# Patient Record
Sex: MALE | Race: WHITE | NOT HISPANIC OR LATINO
[De-identification: names, ages, dates, MRNs, and addresses within clinical notes are randomized per-mention and may not be internally consistent; named-entity substitution may affect disease eponyms.]

---

## 2019-08-05 PROBLEM — Z00.00 ENCOUNTER FOR PREVENTIVE HEALTH EXAMINATION: Status: ACTIVE | Noted: 2019-08-05

## 2019-08-19 ENCOUNTER — APPOINTMENT (OUTPATIENT)
Dept: DERMATOLOGY | Facility: CLINIC | Age: 54
End: 2019-08-19
Payer: COMMERCIAL

## 2019-08-19 VITALS — BODY MASS INDEX: 30.8 KG/M2 | WEIGHT: 220 LBS | HEIGHT: 71 IN

## 2019-08-19 DIAGNOSIS — Z78.9 OTHER SPECIFIED HEALTH STATUS: ICD-10-CM

## 2019-08-19 DIAGNOSIS — Z84.0 FAMILY HISTORY OF DISEASES OF THE SKIN AND SUBCUTANEOUS TISSUE: ICD-10-CM

## 2019-08-19 DIAGNOSIS — Z86.79 PERSONAL HISTORY OF OTHER DISEASES OF THE CIRCULATORY SYSTEM: ICD-10-CM

## 2019-08-19 DIAGNOSIS — Z87.2 PERSONAL HISTORY OF DISEASES OF THE SKIN AND SUBCUTANEOUS TISSUE: ICD-10-CM

## 2019-08-19 DIAGNOSIS — L30.8 OTHER SPECIFIED DERMATITIS: ICD-10-CM

## 2019-08-19 DIAGNOSIS — D18.01 HEMANGIOMA OF SKIN AND SUBCUTANEOUS TISSUE: ICD-10-CM

## 2019-08-19 PROCEDURE — 99202 OFFICE O/P NEW SF 15 MIN: CPT

## 2019-08-19 PROCEDURE — D0125: CPT

## 2019-08-19 RX ORDER — AMLODIPINE AND OLMESARTAN MEDOXOMIL 5; 20 MG/1; MG/1
5-20 TABLET ORAL
Refills: 0 | Status: ACTIVE | COMMUNITY

## 2019-08-19 RX ORDER — METOPROLOL SUCCINATE 25 MG/1
25 TABLET, EXTENDED RELEASE ORAL
Refills: 0 | Status: ACTIVE | COMMUNITY

## 2019-08-19 RX ORDER — FLUOCINONIDE 0.5 MG/G
0.05 CREAM TOPICAL
Qty: 1 | Refills: 1 | Status: ACTIVE | COMMUNITY
Start: 2019-08-19 | End: 1900-01-01

## 2019-08-19 RX ORDER — KETOCONAZOLE 20 MG/G
2 CREAM TOPICAL
Refills: 0 | Status: ACTIVE | COMMUNITY

## 2019-08-19 NOTE — HISTORY OF PRESENT ILLNESS
[de-identified] : First visit for 53-year-old white male presenting with persistent peeling and cracking of the fingertips. Patient has had this problem in the past during the winter but this is the first summer where it has been persistent. No previous treatment.  Patient washes his hands frequently\par Patient also complains of growths on the back. [FreeTextEntry1] : Feeling of the fingertips

## 2019-08-19 NOTE — PHYSICAL EXAM
[Alert] : alert [Oriented x 3] : ~L oriented x 3 [Well Nourished] : well nourished [Face] : Face [Nose] : Nose [Eyelids] : Eyelids [Ears] : Ears [Lips] : Lips [FreeTextEntry3] : Focal hard brown dry scaling with rare seizures on the right one 2 and 3 fingers and left one 2 and 3 fingers\par Back: Mild to moderate 2-4 mm bright erythematous papules, some are protuberant

## 2023-03-05 ENCOUNTER — INPATIENT (INPATIENT)
Facility: HOSPITAL | Age: 58
LOS: 3 days | Discharge: HOME | DRG: 871 | End: 2023-03-09
Attending: INTERNAL MEDICINE | Admitting: STUDENT IN AN ORGANIZED HEALTH CARE EDUCATION/TRAINING PROGRAM
Payer: COMMERCIAL

## 2023-03-05 VITALS
HEIGHT: 71 IN | WEIGHT: 235.01 LBS | HEART RATE: 114 BPM | DIASTOLIC BLOOD PRESSURE: 80 MMHG | OXYGEN SATURATION: 94 % | RESPIRATION RATE: 20 BRPM | SYSTOLIC BLOOD PRESSURE: 136 MMHG | TEMPERATURE: 98 F

## 2023-03-05 DIAGNOSIS — J18.9 PNEUMONIA, UNSPECIFIED ORGANISM: ICD-10-CM

## 2023-03-05 LAB
ALBUMIN SERPL ELPH-MCNC: 3.4 G/DL — SIGNIFICANT CHANGE UP (ref 3.3–5.2)
ALP SERPL-CCNC: 104 U/L — SIGNIFICANT CHANGE UP (ref 40–120)
ALT FLD-CCNC: 191 U/L — HIGH
ANION GAP SERPL CALC-SCNC: 14 MMOL/L — SIGNIFICANT CHANGE UP (ref 5–17)
AST SERPL-CCNC: 188 U/L — HIGH
BASOPHILS # BLD AUTO: 0 K/UL — SIGNIFICANT CHANGE UP (ref 0–0.2)
BASOPHILS NFR BLD AUTO: 0 % — SIGNIFICANT CHANGE UP (ref 0–2)
BILIRUB SERPL-MCNC: 0.5 MG/DL — SIGNIFICANT CHANGE UP (ref 0.4–2)
BUN SERPL-MCNC: 17.8 MG/DL — SIGNIFICANT CHANGE UP (ref 8–20)
CALCIUM SERPL-MCNC: 8.8 MG/DL — SIGNIFICANT CHANGE UP (ref 8.4–10.5)
CHLORIDE SERPL-SCNC: 96 MMOL/L — SIGNIFICANT CHANGE UP (ref 96–108)
CO2 SERPL-SCNC: 25 MMOL/L — SIGNIFICANT CHANGE UP (ref 22–29)
CREAT SERPL-MCNC: 0.88 MG/DL — SIGNIFICANT CHANGE UP (ref 0.5–1.3)
EGFR: 100 ML/MIN/1.73M2 — SIGNIFICANT CHANGE UP
EOSINOPHIL # BLD AUTO: 0 K/UL — SIGNIFICANT CHANGE UP (ref 0–0.5)
EOSINOPHIL NFR BLD AUTO: 0 % — SIGNIFICANT CHANGE UP (ref 0–6)
FLUAV AG NPH QL: SIGNIFICANT CHANGE UP
FLUBV AG NPH QL: SIGNIFICANT CHANGE UP
GIANT PLATELETS BLD QL SMEAR: PRESENT — SIGNIFICANT CHANGE UP
GLUCOSE SERPL-MCNC: 120 MG/DL — HIGH (ref 70–99)
HCT VFR BLD CALC: 42.3 % — SIGNIFICANT CHANGE UP (ref 39–50)
HGB BLD-MCNC: 14.6 G/DL — SIGNIFICANT CHANGE UP (ref 13–17)
INR BLD: 1.17 RATIO — HIGH (ref 0.88–1.16)
LACTATE BLDV-MCNC: 2.1 MMOL/L — HIGH (ref 0.5–2)
LYMPHOCYTES # BLD AUTO: 0.28 K/UL — LOW (ref 1–3.3)
LYMPHOCYTES # BLD AUTO: 6.9 % — LOW (ref 13–44)
MANUAL SMEAR VERIFICATION: SIGNIFICANT CHANGE UP
MCHC RBC-ENTMCNC: 28.6 PG — SIGNIFICANT CHANGE UP (ref 27–34)
MCHC RBC-ENTMCNC: 34.5 GM/DL — SIGNIFICANT CHANGE UP (ref 32–36)
MCV RBC AUTO: 82.9 FL — SIGNIFICANT CHANGE UP (ref 80–100)
METAMYELOCYTES # FLD: 0.9 % — HIGH (ref 0–0)
MONOCYTES # BLD AUTO: 0.32 K/UL — SIGNIFICANT CHANGE UP (ref 0–0.9)
MONOCYTES NFR BLD AUTO: 7.8 % — SIGNIFICANT CHANGE UP (ref 2–14)
NEUTROPHILS # BLD AUTO: 3.42 K/UL — SIGNIFICANT CHANGE UP (ref 1.8–7.4)
NEUTROPHILS NFR BLD AUTO: 77.4 % — HIGH (ref 43–77)
NEUTS BAND # BLD: 6.1 % — SIGNIFICANT CHANGE UP (ref 0–8)
PLAT MORPH BLD: NORMAL — SIGNIFICANT CHANGE UP
PLATELET # BLD AUTO: 175 K/UL — SIGNIFICANT CHANGE UP (ref 150–400)
POTASSIUM SERPL-MCNC: 3.7 MMOL/L — SIGNIFICANT CHANGE UP (ref 3.5–5.3)
POTASSIUM SERPL-SCNC: 3.7 MMOL/L — SIGNIFICANT CHANGE UP (ref 3.5–5.3)
PROT SERPL-MCNC: 7.1 G/DL — SIGNIFICANT CHANGE UP (ref 6.6–8.7)
PROTHROM AB SERPL-ACNC: 13.6 SEC — HIGH (ref 10.5–13.4)
RBC # BLD: 5.1 M/UL — SIGNIFICANT CHANGE UP (ref 4.2–5.8)
RBC # FLD: 12.9 % — SIGNIFICANT CHANGE UP (ref 10.3–14.5)
RBC BLD AUTO: NORMAL — SIGNIFICANT CHANGE UP
RSV RNA NPH QL NAA+NON-PROBE: SIGNIFICANT CHANGE UP
SARS-COV-2 RNA SPEC QL NAA+PROBE: DETECTED
SMUDGE CELLS # BLD: PRESENT — SIGNIFICANT CHANGE UP
SODIUM SERPL-SCNC: 135 MMOL/L — SIGNIFICANT CHANGE UP (ref 135–145)
VARIANT LYMPHS # BLD: 0.9 % — SIGNIFICANT CHANGE UP (ref 0–6)
WBC # BLD: 4.1 K/UL — SIGNIFICANT CHANGE UP (ref 3.8–10.5)
WBC # FLD AUTO: 4.1 K/UL — SIGNIFICANT CHANGE UP (ref 3.8–10.5)

## 2023-03-05 PROCEDURE — 93010 ELECTROCARDIOGRAM REPORT: CPT

## 2023-03-05 PROCEDURE — 99285 EMERGENCY DEPT VISIT HI MDM: CPT

## 2023-03-05 PROCEDURE — 71250 CT THORAX DX C-: CPT | Mod: 26,MA

## 2023-03-05 PROCEDURE — 99223 1ST HOSP IP/OBS HIGH 75: CPT

## 2023-03-05 PROCEDURE — 71045 X-RAY EXAM CHEST 1 VIEW: CPT | Mod: 26

## 2023-03-05 RX ORDER — ACETAMINOPHEN 500 MG
1000 TABLET ORAL ONCE
Refills: 0 | Status: COMPLETED | OUTPATIENT
Start: 2023-03-05 | End: 2023-03-05

## 2023-03-05 RX ORDER — ENOXAPARIN SODIUM 100 MG/ML
40 INJECTION SUBCUTANEOUS EVERY 24 HOURS
Refills: 0 | Status: DISCONTINUED | OUTPATIENT
Start: 2023-03-05 | End: 2023-03-07

## 2023-03-05 RX ORDER — AMLODIPINE BESYLATE 2.5 MG/1
1 TABLET ORAL
Qty: 0 | Refills: 0 | DISCHARGE

## 2023-03-05 RX ORDER — BUDESONIDE AND FORMOTEROL FUMARATE DIHYDRATE 160; 4.5 UG/1; UG/1
2 AEROSOL RESPIRATORY (INHALATION)
Refills: 0 | Status: DISCONTINUED | OUTPATIENT
Start: 2023-03-05 | End: 2023-03-09

## 2023-03-05 RX ORDER — ALBUTEROL 90 UG/1
2 AEROSOL, METERED ORAL EVERY 4 HOURS
Refills: 0 | Status: DISCONTINUED | OUTPATIENT
Start: 2023-03-05 | End: 2023-03-09

## 2023-03-05 RX ORDER — LANOLIN ALCOHOL/MO/W.PET/CERES
3 CREAM (GRAM) TOPICAL AT BEDTIME
Refills: 0 | Status: DISCONTINUED | OUTPATIENT
Start: 2023-03-05 | End: 2023-03-09

## 2023-03-05 RX ORDER — CEFTRIAXONE 500 MG/1
1000 INJECTION, POWDER, FOR SOLUTION INTRAMUSCULAR; INTRAVENOUS ONCE
Refills: 0 | Status: DISCONTINUED | OUTPATIENT
Start: 2023-03-05 | End: 2023-03-05

## 2023-03-05 RX ORDER — AMLODIPINE BESYLATE 2.5 MG/1
5 TABLET ORAL DAILY
Refills: 0 | Status: DISCONTINUED | OUTPATIENT
Start: 2023-03-05 | End: 2023-03-09

## 2023-03-05 RX ORDER — PIPERACILLIN AND TAZOBACTAM 4; .5 G/20ML; G/20ML
3.38 INJECTION, POWDER, LYOPHILIZED, FOR SOLUTION INTRAVENOUS ONCE
Refills: 0 | Status: DISCONTINUED | OUTPATIENT
Start: 2023-03-05 | End: 2023-03-05

## 2023-03-05 RX ORDER — REMDESIVIR 5 MG/ML
INJECTION INTRAVENOUS
Refills: 0 | Status: DISCONTINUED | OUTPATIENT
Start: 2023-03-05 | End: 2023-03-09

## 2023-03-05 RX ORDER — REMDESIVIR 5 MG/ML
100 INJECTION INTRAVENOUS EVERY 24 HOURS
Refills: 0 | Status: DISCONTINUED | OUTPATIENT
Start: 2023-03-06 | End: 2023-03-09

## 2023-03-05 RX ORDER — AZITHROMYCIN 500 MG/1
500 TABLET, FILM COATED ORAL ONCE
Refills: 0 | Status: COMPLETED | OUTPATIENT
Start: 2023-03-05 | End: 2023-03-05

## 2023-03-05 RX ORDER — IPRATROPIUM/ALBUTEROL SULFATE 18-103MCG
3 AEROSOL WITH ADAPTER (GRAM) INHALATION EVERY 6 HOURS
Refills: 0 | Status: DISCONTINUED | OUTPATIENT
Start: 2023-03-05 | End: 2023-03-05

## 2023-03-05 RX ORDER — SODIUM CHLORIDE 9 MG/ML
1000 INJECTION, SOLUTION INTRAVENOUS
Refills: 0 | Status: COMPLETED | OUTPATIENT
Start: 2023-03-05 | End: 2023-03-05

## 2023-03-05 RX ORDER — METOPROLOL TARTRATE 50 MG
1 TABLET ORAL
Qty: 0 | Refills: 0 | DISCHARGE

## 2023-03-05 RX ORDER — REMDESIVIR 5 MG/ML
200 INJECTION INTRAVENOUS EVERY 24 HOURS
Refills: 0 | Status: COMPLETED | OUTPATIENT
Start: 2023-03-05 | End: 2023-03-05

## 2023-03-05 RX ORDER — DEXAMETHASONE 0.5 MG/5ML
6 ELIXIR ORAL DAILY
Refills: 0 | Status: DISCONTINUED | OUTPATIENT
Start: 2023-03-05 | End: 2023-03-09

## 2023-03-05 RX ORDER — ACETAMINOPHEN 500 MG
650 TABLET ORAL EVERY 6 HOURS
Refills: 0 | Status: DISCONTINUED | OUTPATIENT
Start: 2023-03-05 | End: 2023-03-09

## 2023-03-05 RX ORDER — CEFEPIME 1 G/1
1000 INJECTION, POWDER, FOR SOLUTION INTRAMUSCULAR; INTRAVENOUS EVERY 8 HOURS
Refills: 0 | Status: DISCONTINUED | OUTPATIENT
Start: 2023-03-05 | End: 2023-03-05

## 2023-03-05 RX ORDER — SODIUM CHLORIDE 9 MG/ML
2500 INJECTION INTRAMUSCULAR; INTRAVENOUS; SUBCUTANEOUS ONCE
Refills: 0 | Status: COMPLETED | OUTPATIENT
Start: 2023-03-05 | End: 2023-03-05

## 2023-03-05 RX ORDER — CEFTRIAXONE 500 MG/1
1000 INJECTION, POWDER, FOR SOLUTION INTRAMUSCULAR; INTRAVENOUS ONCE
Refills: 0 | Status: COMPLETED | OUTPATIENT
Start: 2023-03-05 | End: 2023-03-05

## 2023-03-05 RX ORDER — METOPROLOL TARTRATE 50 MG
50 TABLET ORAL DAILY
Refills: 0 | Status: DISCONTINUED | OUTPATIENT
Start: 2023-03-05 | End: 2023-03-09

## 2023-03-05 RX ADMIN — Medication 6 MILLIGRAM(S): at 18:42

## 2023-03-05 RX ADMIN — AZITHROMYCIN 500 MILLIGRAM(S): 500 TABLET, FILM COATED ORAL at 13:11

## 2023-03-05 RX ADMIN — SODIUM CHLORIDE 70 MILLILITER(S): 9 INJECTION, SOLUTION INTRAVENOUS at 18:44

## 2023-03-05 RX ADMIN — Medication 400 MILLIGRAM(S): at 13:08

## 2023-03-05 RX ADMIN — BUDESONIDE AND FORMOTEROL FUMARATE DIHYDRATE 2 PUFF(S): 160; 4.5 AEROSOL RESPIRATORY (INHALATION) at 20:44

## 2023-03-05 RX ADMIN — Medication 100 MILLIGRAM(S): at 18:42

## 2023-03-05 RX ADMIN — REMDESIVIR 200 MILLIGRAM(S): 5 INJECTION INTRAVENOUS at 18:39

## 2023-03-05 RX ADMIN — CEFTRIAXONE 1000 MILLIGRAM(S): 500 INJECTION, POWDER, FOR SOLUTION INTRAMUSCULAR; INTRAVENOUS at 12:03

## 2023-03-05 RX ADMIN — ALBUTEROL 2 PUFF(S): 90 AEROSOL, METERED ORAL at 20:44

## 2023-03-05 RX ADMIN — AZITHROMYCIN 255 MILLIGRAM(S): 500 TABLET, FILM COATED ORAL at 12:04

## 2023-03-05 RX ADMIN — SODIUM CHLORIDE 2500 MILLILITER(S): 9 INJECTION INTRAMUSCULAR; INTRAVENOUS; SUBCUTANEOUS at 12:04

## 2023-03-05 NOTE — ED ADULT NURSE NOTE - OBJECTIVE STATEMENT
Pt BIB EMS c/o SOB on and off 1 x wk hx w/ pna hx htn prostate ca  in no acute distress placed on cardiac monitor no respiratory distress noted pending md david rey rn

## 2023-03-05 NOTE — ED PROVIDER NOTE - PHYSICAL EXAMINATION
General: well appearing, NAD  Head: NC/AT  Cardiac: tachycardic, regular rhythm, no m/r/g  Respiratory: decreased breath sounds RLL, equal chest wall expansions, no conversational dyspnea  Abdomen: soft, ND, NT  Neuro: AAOx3, coordinated movement of all 4 extremities  Psych: calm, cooperative, normal affect  Skin: warm and dry

## 2023-03-05 NOTE — H&P ADULT - NSHPPHYSICALEXAM_GEN_ALL_CORE
GENERAL: pt examined bedside, laying comfortably in bed in NAD  HEENT: NC/AT, moist oral mucosa, clear conjunctiva, sclera nonicteric  RESPIRATORY: Normal respiratory effort; CTA b/l, no wheezing, rhonchi, rales  CARDIOVASCULAR: RRR, normal S1 and S2, no murmur/rub/gallop  ABDOMEN: soft, NT/ND, normoactive bowel sounds, no rebound/guarding  MSK: No joint deformities, edema, erythema  EXTREMITIES: No cynaosis, no clubbing, no lower extremity edema; Peripheral pulses are 2+ bilaterally  PSYCH: affect appropriate and cooperative  NEUROLOGY: A+O to person, place, and time, no focal neurologic deficits appreciated   SKIN: No rashes or no palpable lesions GENERAL: pt examined bedside, appears uncomfortable in mild distress, speaking only 3-4 word sentences   HEENT: NC/AT, dry oral mucosa, clear conjunctiva, sclera nonicteric  RESPIRATORY: poor inspiratory effort, scattered wheezing b/l and bibasilar rales   CARDIOVASCULAR: RRR, normal S1 and S2  ABDOMEN: soft, NT/ND, + bowel sounds, no rebound/guarding  MSK: No joint deformities, edema, erythema  EXTREMITIES: No cynaosis, no clubbing, no lower extremity edema, pulses are 2+ bilaterally  PSYCH: affect appropriate and cooperative  NEUROLOGY: A+O to person, place, and time, no focal neurologic deficits appreciated   SKIN: No rashes or no palpable lesions

## 2023-03-05 NOTE — ED PROVIDER NOTE - CLINICAL SUMMARY MEDICAL DECISION MAKING FREE TEXT BOX
56yo M w/pmh HTN, prostate ca presents for fever, cough, SOB x7d. Labs, CXR pending. Fluids, antibiotics given. 56yo M w/pmh HTN, prostate ca presents for fever, cough, SOB x7d. Fluids, antibiotics given. Labs show lactate 2.1, CXR and CT chest show multifocal PNA. Patient feels improved with medications but O2 persistently borderline at 92-93% on RA, HR 100s at rest. Plan for admission due to failure of outpatient antibiotics and persistently borderline SIRS vital signs.

## 2023-03-05 NOTE — H&P ADULT - ASSESSMENT
58 y/o M w/ PMH of prostate CA (not on tx), HTN, came in c/o sob w/ associated productive cough and subjective fevers x 1 week after returning from [place] via plane.  Pt had telehealth visit w/ PMD 5 days ago and was sent for an outpt CXR and as per pt was told he had a "double PNA" and was prescribed levaquin 750mg however symptoms persisted and continues to have fevers.  Septic in ED w/ tachycardia and tachypnea.  O2 sat ORA 92-94%.  Clinically toxic appearing and speaking 3-4 word sentences.  Poor inspiratory effort, +wheezing and rales on exam.  CT chest w/ b/l multifocal pna.  ED gave rocephin and azithro.   Found to be covid +.  Pt admitted for sepssi 2/2 covid PNA.       Sepsis poa 2/2 multifocal pna 2/2 covid pna   - Admit to medicine w/   - Tachy and tachypnic on admission  - Recieved 1x dose of rocephin and azithro in ED but given failed levaquin 750mg outpt however CT findings likely 2/2 covid PNA therefore will hold off on further abx for now   - Flu/RSV negative but COVID +; Placed on isolation precautions per protocol and started on remdesivir and decadron   - Monitor renal fxn and LFTs while on remdesivir  - Monitor inflamatory markers [id number]  - Mild LA of 2.1 from sepsis   - c/w gentle hydration overnight for insensible losses   - No leukocytosis but L-shift noted as well as bandemia of 6.1%   - Afebrile on arrival but reporting subjective fevers at home   - Transaminities likely 2/2 sepsis; monitor LFTs given starting on remdesivir   - Blood cxs ordered by ED; f/u results   - Will order sputum cultures  - Given CT chest findings full RVP, legionella/strep pna Uag and MRSA PCR  - Monitor pulse ox and place on supplemental O2 to maintain O2 sats >92%  - Bronchodiolators and mucolytics as needed   - Monitor CBC and temperature       Transaminitis   - Likely 2/2 sepsis   - ALP and tbili nL  - Asymptomatic and benign abd exam   - Montior LFTs closely given started on remdesivir   - Trend LFTs         VTE ppx: lovenox         58 y/o M w/ PMH of prostate CA (not on tx), HTN, came in c/o sob w/ associated productive cough and subjective fevers x 1 week after returning from [place] via plane.  Pt had telehealth visit w/ PMD 5 days ago and was sent for an outpt CXR and as per pt was told he had a "double PNA" and was prescribed levaquin 750mg however symptoms persisted and continues to have fevers.  Septic in ED w/ tachycardia and tachypnea.  O2 sat ORA 92-94%.  Clinically toxic appearing and speaking 3-4 word sentences.  Poor inspiratory effort, +wheezing and rales on exam.  CT chest w/ b/l multifocal pna.  ED gave rocephin and azithro.   Found to be covid +.  Pt admitted for sepsisi 2/2 covid PNA.       Sepsis poa 2/2 multifocal pna 2/2 covid pna   - Admit to medicine w/   - Tachy and tachypnic on admission  - Recieved 1x dose of rocephin and azithro in ED but given failed levaquin 750mg outpt however CT findings likely 2/2 covid PNA therefore will hold off on further abx for now   - Flu/RSV negative but COVID +; Placed on isolation precautions per protocol and started on remdesivir and decadron   - Monitor renal fxn and LFTs while on remdesivir  - Monitor inflamatory markers [id number]  - Mild LA of 2.1 from sepsis   - c/w gentle hydration overnight for insensible losses   - No leukocytosis but L-shift noted as well as bandemia of 6.1%   - Afebrile on arrival but reporting subjective fevers at home   - Transaminities likely 2/2 sepsis; monitor LFTs given starting on remdesivir   - Blood cxs ordered by ED; f/u results   - Will order sputum cultures  - Given CT chest findings full RVP, legionella/strep pna Uag and MRSA PCR  - Monitor pulse ox and place on supplemental O2 to maintain O2 sats >92%  - Bronchodiolators and mucolytics as needed   - Monitor CBC and temperature   - Incentive spirometer ordered       Transaminitis   - Likely 2/2 sepsis   - ALP and tbili nL  - Asymptomatic and benign abd exam   - Montior LFTs closely given started on remdesivir   - Trend LFTs       HTN   - Resume home meds         VTE ppx: lovenox

## 2023-03-05 NOTE — ED ADULT TRIAGE NOTE - CHIEF COMPLAINT QUOTE
Pt BIBA for worsening sob, fever and body aches.  Pt being treated for double pna on Levaquin since Tuesday.  Pt last took motrin/tylenol at 8am for fever.  Pt denies chest pain

## 2023-03-05 NOTE — H&P ADULT - NSHPREVIEWOFSYSTEMS_GEN_ALL_CORE
CONSTITUTIONAL: no fevers, chills, night sweats, weight changes  HEENT: no vision changes or diplopia, no tinnitus, no sore throat  RESPIRATORY: denies dyspnea, sob, nocturnal cough, sputum or hemoptysis  CARDIOVASCULAR: no CP, palpitations or lower extremity edema  GI: no dysphagia, nausea, abd pain, constipation, diarrhea, stool change or blood in stool  : no dysuria or hematuria, no flank pain, no incontinence or urinary retention  MSK: no joint pain or swelling  INTEGUMENTARY: no rashes or lesions  NEUROLOGICAL: no HA, confusion, syncope, numbness, weakness, tremors or ataxia  PSYCH: denies depression  ENDOCRINE: no polyuria, polydipsia, no temp intolerance, no tremors, no changes in skin, hair or nails  HEMATOLOGIC/LYMPHATIC: no lymph node enlargement, abnormal bruising or bleeding CONSTITUTIONAL: +fevers, +chills, no night sweats, weight changes  HEENT: no vision changes or diplopia, no tinnitus, no sore throat  RESPIRATORY: +dyspnea,+ sob, nocturnal cough, + sputum or no hemoptysis  CARDIOVASCULAR: no CP, palpitations or lower extremity edema  GI: no dysphagia, nausea, abd pain, constipation, diarrhea, stool change or blood in stool  : no dysuria or hematuria, no flank pain, no incontinence or urinary retention  MSK: no joint pain or swelling  INTEGUMENTARY: no rashes or lesions  NEUROLOGICAL: no HA, confusion, syncope, numbness, weakness, tremors or ataxia  PSYCH: denies depression  ENDOCRINE: no polyuria, polydipsia, no temp intolerance, no tremors, no changes in skin, hair or nails  HEMATOLOGIC/LYMPHATIC: no lymph node enlargement, abnormal bruising or bleeding

## 2023-03-05 NOTE — H&P ADULT - HISTORY OF PRESENT ILLNESS
CONSTITUTIONAL: no fevers, chills, night sweats, weight changes  HEENT: no vision changes or diplopia, no tinnitus, no sore throat  RESPIRATORY: denies dyspnea, sob, nocturnal cough, sputum or hemoptysis  CARDIOVASCULAR: no CP, palpitations or lower extremity edema  GI: no dysphagia, nausea, abd pain, constipation, diarrhea, stool change or blood in stool  : no dysuria or hematuria, no flank pain, no incontinence or urinary retention  MSK: no joint pain or swelling  INTEGUMENTARY: no rashes or lesions  NEUROLOGICAL: no HA, confusion, syncope, numbness, weakness, tremors or ataxia  PSYCH: denies depression  ENDOCRINE: no polyuria, polydipsia, no temp intolerance, no tremors, no changes in skin, hair or nails  HEMATOLOGIC/LYMPHATIC: no lymph node enlargement, abnormal bruising or bleeding   56 y/o M w/ PMH of prostate CA (not on tx), HTN, came in c/o sob w/ associated productive cough and subjective fevers x 1 week after returning from [place] via plane.  Pt had telehealth visit w/ PMD 5 days ago and was sent for an outpt CXR and as per pt was told he had a "double PNA" and was prescribed levaquin 750mg however symptoms persisted and continues to have fevers.  Pt reports his fever yesterday was as high as 103.6.  He has been taking NSAIDs and tylenol for fevers and has been compliant w/ abxs but symptoms have worsened.  Pt denies sick contacts however recently traveled from [place].  Pt also denies cp, palpitations, LE swelling/pain, orthopnea, hemoptysis.

## 2023-03-05 NOTE — ED PROVIDER NOTE - ATTENDING CONTRIBUTION TO CARE
Jorje: I performed a face to face evaluation of this patient and performed a full history and physical examination on the patient.  I agree with the resident's history, physical examination, and plan of the patient unless otherwise noted. My brief assessment is as follows: pmh as documented c/o 6 days of fever to 103-104, had xray and was told had "double pna" started on levaquin. continued fever today with cough and sob. took 200mg iburpfeon at 7am and no fever since per pt. no abd pain, no urinary symptoms,n o chest pain, no uri symptoms. non toxic, well appearing, satting 95% on room air, clear lungs, speaking full sentences. pulse ~100-110 abd benign. no swelling b/l LE. neuro intact. sepsis labs, fluids, abx, xr/chest ct. reassess

## 2023-03-05 NOTE — ED ADULT NURSE REASSESSMENT NOTE - NS ED NURSE REASSESS COMMENT FT1
Pt report given to CDU RN , pt in no acute distress denies any discomfort at this time - pending transportation to unit candido daly

## 2023-03-05 NOTE — ED PROVIDER NOTE - OBJECTIVE STATEMENT
56yo M w/pmh HTN, prostate ca presents for fever, cough, SOB x7d. Reports onset last Sunday, telehealth meeting with his PCP Tuesday (5d ago), reports outpatient CXR showed "double PNA", started on levoquin. Reports symptoms unchanged since then despite medication compliance, Tmax 103.6, taking tylenol and ibuprofen around the clock. Denies smoking, asthma, other lung disease. Reports he occasionally gets a medication for the prostate ca when his PSH gets too high, most recently 6 months ago. No recent antibiotics prior to the levoquin, or steroids. Reports childhood penicillin allergy.

## 2023-03-05 NOTE — H&P ADULT - NSHPLABSRESULTS_GEN_ALL_CORE
14.6   4.10  )-----------( 175      ( 05 Mar 2023 12:00 )             42.3         03-05    135  |  96  |  17.8  ----------------------------<  120<H>  3.7   |  25.0  |  0.88    Ca    8.8      05 Mar 2023 12:00    TPro  7.1  /  Alb  3.4  /  TBili  0.5  /  DBili  x   /  AST  188<H>  /  ALT  191<H>  /  AlkPhos  104  03-05      < from: Xray Chest 1 View- PORTABLE-Urgent (Xray Chest 1 View- PORTABLE-Urgent .) (03.05.23 @ 13:10) >      IMPRESSION: There is bibasilar patchy opacification that is more   prominent on the right and may be compatible with pneumonia. The heart is   normal in size. The bones are intact.    < end of copied text >        < from: CT Chest No Cont (03.05.23 @ 13:51) >    IMPRESSION: Bilateral lower lung opacities, most confluent within the   right lower lobe as described above suggestive of pneumonia with   endobronchial spread.    Associated mediastinal and right hilar lymph nodes as above are likely   reactive.    Comparison with prior studies are recommended and if images are made   available, an addendum can be issued. Alternatively a 1-3 month follow-up   noncontrast chest CT is recommended to ensure resolution.    < end of copied text >

## 2023-03-05 NOTE — ED PROVIDER NOTE - NS ED ROS FT
Constitutional: +fever, no sweats, and no chills.  CV: no chest pain, no edema.  Resp: +cough, +dyspnea  GI: no abdominal pain, no nausea and no vomiting.  MSK: no msk pain, no weakness  Skin: no lesions, and no rashes.  Neuro: no LOC, no headache, no dizziness  ROS otherwise negative except as noted in HPI.

## 2023-03-06 LAB
ALBUMIN SERPL ELPH-MCNC: 3.3 G/DL — SIGNIFICANT CHANGE UP (ref 3.3–5.2)
ALP SERPL-CCNC: 97 U/L — SIGNIFICANT CHANGE UP (ref 40–120)
ALT FLD-CCNC: 189 U/L — HIGH
ANION GAP SERPL CALC-SCNC: 11 MMOL/L — SIGNIFICANT CHANGE UP (ref 5–17)
ANION GAP SERPL CALC-SCNC: 16 MMOL/L — SIGNIFICANT CHANGE UP (ref 5–17)
AST SERPL-CCNC: 156 U/L — HIGH
BASOPHILS # BLD AUTO: 0 K/UL — SIGNIFICANT CHANGE UP (ref 0–0.2)
BASOPHILS NFR BLD AUTO: 0 % — SIGNIFICANT CHANGE UP (ref 0–2)
BILIRUB SERPL-MCNC: 0.4 MG/DL — SIGNIFICANT CHANGE UP (ref 0.4–2)
BUN SERPL-MCNC: 12.1 MG/DL — SIGNIFICANT CHANGE UP (ref 8–20)
BUN SERPL-MCNC: 15.4 MG/DL — SIGNIFICANT CHANGE UP (ref 8–20)
CALCIUM SERPL-MCNC: 8.6 MG/DL — SIGNIFICANT CHANGE UP (ref 8.4–10.5)
CALCIUM SERPL-MCNC: 8.7 MG/DL — SIGNIFICANT CHANGE UP (ref 8.4–10.5)
CHLORIDE SERPL-SCNC: 96 MMOL/L — SIGNIFICANT CHANGE UP (ref 96–108)
CHLORIDE SERPL-SCNC: 99 MMOL/L — SIGNIFICANT CHANGE UP (ref 96–108)
CO2 SERPL-SCNC: 23 MMOL/L — SIGNIFICANT CHANGE UP (ref 22–29)
CO2 SERPL-SCNC: 27 MMOL/L — SIGNIFICANT CHANGE UP (ref 22–29)
CREAT SERPL-MCNC: 0.75 MG/DL — SIGNIFICANT CHANGE UP (ref 0.5–1.3)
CREAT SERPL-MCNC: 0.94 MG/DL — SIGNIFICANT CHANGE UP (ref 0.5–1.3)
CRP SERPL-MCNC: 166 MG/L — HIGH
D DIMER BLD IA.RAPID-MCNC: 2146 NG/ML DDU — HIGH
EGFR: 105 ML/MIN/1.73M2 — SIGNIFICANT CHANGE UP
EGFR: 94 ML/MIN/1.73M2 — SIGNIFICANT CHANGE UP
EOSINOPHIL # BLD AUTO: 0 K/UL — SIGNIFICANT CHANGE UP (ref 0–0.5)
EOSINOPHIL NFR BLD AUTO: 0 % — SIGNIFICANT CHANGE UP (ref 0–6)
FERRITIN SERPL-MCNC: 5308 NG/ML — HIGH (ref 30–400)
GAS PNL BLDA: SIGNIFICANT CHANGE UP
GAS PNL BLDA: SIGNIFICANT CHANGE UP
GIANT PLATELETS BLD QL SMEAR: PRESENT — SIGNIFICANT CHANGE UP
GLUCOSE SERPL-MCNC: 171 MG/DL — HIGH (ref 70–99)
GLUCOSE SERPL-MCNC: 203 MG/DL — HIGH (ref 70–99)
HCT VFR BLD CALC: 38.4 % — LOW (ref 39–50)
HGB BLD-MCNC: 13.6 G/DL — SIGNIFICANT CHANGE UP (ref 13–17)
INR BLD: 1.19 RATIO — HIGH (ref 0.88–1.16)
LDH SERPL L TO P-CCNC: 504 U/L — HIGH (ref 98–192)
LYMPHOCYTES # BLD AUTO: 0.17 K/UL — LOW (ref 1–3.3)
LYMPHOCYTES # BLD AUTO: 7 % — LOW (ref 13–44)
MAGNESIUM SERPL-MCNC: 2.2 MG/DL — SIGNIFICANT CHANGE UP (ref 1.8–2.6)
MANUAL SMEAR VERIFICATION: SIGNIFICANT CHANGE UP
MCHC RBC-ENTMCNC: 29.1 PG — SIGNIFICANT CHANGE UP (ref 27–34)
MCHC RBC-ENTMCNC: 35.4 GM/DL — SIGNIFICANT CHANGE UP (ref 32–36)
MCV RBC AUTO: 82.1 FL — SIGNIFICANT CHANGE UP (ref 80–100)
MONOCYTES # BLD AUTO: 0.11 K/UL — SIGNIFICANT CHANGE UP (ref 0–0.9)
MONOCYTES NFR BLD AUTO: 4.4 % — SIGNIFICANT CHANGE UP (ref 2–14)
NEUTROPHILS # BLD AUTO: 2.06 K/UL — SIGNIFICANT CHANGE UP (ref 1.8–7.4)
NEUTROPHILS NFR BLD AUTO: 82.5 % — HIGH (ref 43–77)
NEUTS BAND # BLD: 3.5 % — SIGNIFICANT CHANGE UP (ref 0–8)
PLAT MORPH BLD: NORMAL — SIGNIFICANT CHANGE UP
PLATELET # BLD AUTO: 165 K/UL — SIGNIFICANT CHANGE UP (ref 150–400)
POTASSIUM SERPL-MCNC: 3.3 MMOL/L — LOW (ref 3.5–5.3)
POTASSIUM SERPL-MCNC: 3.4 MMOL/L — LOW (ref 3.5–5.3)
POTASSIUM SERPL-SCNC: 3.3 MMOL/L — LOW (ref 3.5–5.3)
POTASSIUM SERPL-SCNC: 3.4 MMOL/L — LOW (ref 3.5–5.3)
PROCALCITONIN SERPL-MCNC: 1.21 NG/ML — HIGH (ref 0.02–0.1)
PROT SERPL-MCNC: 6.7 G/DL — SIGNIFICANT CHANGE UP (ref 6.6–8.7)
PROTHROM AB SERPL-ACNC: 13.8 SEC — HIGH (ref 10.5–13.4)
RBC # BLD: 4.68 M/UL — SIGNIFICANT CHANGE UP (ref 4.2–5.8)
RBC # FLD: 12.9 % — SIGNIFICANT CHANGE UP (ref 10.3–14.5)
RBC BLD AUTO: NORMAL — SIGNIFICANT CHANGE UP
SODIUM SERPL-SCNC: 134 MMOL/L — LOW (ref 135–145)
SODIUM SERPL-SCNC: 138 MMOL/L — SIGNIFICANT CHANGE UP (ref 135–145)
VARIANT LYMPHS # BLD: 2.6 % — SIGNIFICANT CHANGE UP (ref 0–6)
WBC # BLD: 2.39 K/UL — LOW (ref 3.8–10.5)
WBC # FLD AUTO: 2.39 K/UL — LOW (ref 3.8–10.5)

## 2023-03-06 PROCEDURE — 71275 CT ANGIOGRAPHY CHEST: CPT | Mod: 26

## 2023-03-06 PROCEDURE — 99233 SBSQ HOSP IP/OBS HIGH 50: CPT

## 2023-03-06 RX ORDER — POTASSIUM CHLORIDE 20 MEQ
40 PACKET (EA) ORAL ONCE
Refills: 0 | Status: DISCONTINUED | OUTPATIENT
Start: 2023-03-06 | End: 2023-03-06

## 2023-03-06 RX ADMIN — ALBUTEROL 2 PUFF(S): 90 AEROSOL, METERED ORAL at 20:42

## 2023-03-06 RX ADMIN — BUDESONIDE AND FORMOTEROL FUMARATE DIHYDRATE 2 PUFF(S): 160; 4.5 AEROSOL RESPIRATORY (INHALATION) at 20:43

## 2023-03-06 RX ADMIN — Medication 6 MILLIGRAM(S): at 05:35

## 2023-03-06 RX ADMIN — AMLODIPINE BESYLATE 5 MILLIGRAM(S): 2.5 TABLET ORAL at 05:34

## 2023-03-06 RX ADMIN — Medication 650 MILLIGRAM(S): at 00:14

## 2023-03-06 RX ADMIN — Medication 650 MILLIGRAM(S): at 01:31

## 2023-03-06 RX ADMIN — ENOXAPARIN SODIUM 40 MILLIGRAM(S): 100 INJECTION SUBCUTANEOUS at 05:35

## 2023-03-06 RX ADMIN — BUDESONIDE AND FORMOTEROL FUMARATE DIHYDRATE 2 PUFF(S): 160; 4.5 AEROSOL RESPIRATORY (INHALATION) at 09:20

## 2023-03-06 RX ADMIN — ALBUTEROL 2 PUFF(S): 90 AEROSOL, METERED ORAL at 09:20

## 2023-03-06 RX ADMIN — ALBUTEROL 2 PUFF(S): 90 AEROSOL, METERED ORAL at 17:55

## 2023-03-06 RX ADMIN — REMDESIVIR 200 MILLIGRAM(S): 5 INJECTION INTRAVENOUS at 17:55

## 2023-03-06 RX ADMIN — Medication 50 MILLIGRAM(S): at 05:35

## 2023-03-06 RX ADMIN — ALBUTEROL 2 PUFF(S): 90 AEROSOL, METERED ORAL at 05:21

## 2023-03-06 RX ADMIN — ALBUTEROL 2 PUFF(S): 90 AEROSOL, METERED ORAL at 00:11

## 2023-03-06 NOTE — PROVIDER CONTACT NOTE (CRITICAL VALUE NOTIFICATION) - RECOMMENDATIONS
I reviewed the H&P, I examined the patient, and there are no changes in the patient's condition.    
awaiting stat blood work to result.

## 2023-03-06 NOTE — CHART NOTE - NSCHARTNOTEFT_GEN_A_CORE
RN  called @ 1:13 3/6/2023, pt had oral temp of 103 @23:36 on 3/5/2023, improved w/ tylenol. Pt admitted for sepsis w/ B/L PNA most likely from COVID. Pt satting in high 80's, requiring 2 L NC. CT of the chest reviewed, RVP + COVID, BC x2 pending. Labs ordered. Will continue to monitor labs, VS and sx. RN  called @ 1:13 3/6/2023, pt had oral temp of 103 @23:36 on 3/5/2023, improved w/ tylenol. Pt admitted for sepsis w/ B/L PNA most likely from COVID. Pt satting in high 80's, requiring 2 L NC. CT of the chest reviewed, RVP + COVID, BC x2 pending. Labs ordered. Will continue to monitor labs, VS and sx.    Addendum: 58 y/o M w/ PMH of prostate CA (not on tx), HTN, came in c/o sob w/ associated productive cough and subjective fevers x 1 week after returning from [place] via plane.  Pt found to have COVID and b/l multifocal PNA. RN called pt was satting in high 80's when sleeping, 2 L NC placed. Evaluated patient at bedside and  he states he is doing well, denies SOB s/p NC, dyspnea, palpitations, CP, dizziness and headache.    T(C): 36.6   HR: 94   BP: 139/75   RR: 20   SpO2: 95%    CONSTITUTIONAL: Well groomed, no apparent distress, w/ 2 L NC   EYES: PERRLA and symmetric, EOMI  ENMT: dry mucosal membranes  RESP: No respiratory distress, no use of accessory muscles bibasal rales, wheezing improved   CV: tachycardic +S1S2, no MRG; no JVD; no peripheral edema  SKIN: No rashes or ulcers noted  PSYCH: Appropriate insight/judgment; A+O x 3    Assessment/Plan:   -In setting of prostate CA, COVID w/ tachycardia and tachypnea, labs ordered D Dimer >2000   -CTA of the chest ordered r/o PE   -ABG slightly lower O2 w/ 2 L NC from original ABG   -will continue to monitor VS, labs and sx  -sign out to daytime PA

## 2023-03-06 NOTE — PATIENT PROFILE ADULT - LIVING ENVIRONMENT
Cardiology Consult           Date of Admission:  2/28/2023  Date of Consultation:  3/1/2023      PCP:  Nahun Patterson MD      Chief Complaint: Hypertension    History of Present Illness:  Arlene Gonzalez is a 60 y.o. female who presents with history significant for multivessel CAD status post CABG, essential hypertension, mixed hyperlipidemia, CKD, preserved EF 60%. Communication somewhat challenging due to language barrier. Patient originally presents to the ER due to ongoing nausea and what appears to be some flank pain. Patient is followed closely in the outpatient setting due to stage IV CKD. On presentation, creatinine shown to be 3.5. Sodium of 123. Potassium 2.8. Magnesium 1.5. Electrolytes were repleted. Patient was also noted to be significantly hypertensive with systolic greater than 613X. She was started on nifedipine drip with gradual improvement of blood pressures. She was transitioned over to p.o. nifedipine along with the addition of her home ramipril. Troponins were noted to be slightly elevated at 41 and 46. This likely secondary to hypertensive urgency as well as acute on chronic kidney disease. EKG was performed showing sinus rhythm with known right bundle branch block. No evidence of any acute ischemic changes noted. Currently patient states doing better. Completely denies any chest pain or significant shortness of breath. Nausea has begun to resolve. Patient currently off nifedipine drip. Blood pressures much more stable with most recent systolic blood pressure 523. Upon review telemetry patient noted to be in sinus rhythm. No significant arrhythmias noted overnight.       PMH:   has a past medical history of CAD (coronary artery disease), CHF (congestive heart failure) ([address] CKD (chronic kidney disease) stage 3, GFR 30-59 ml/min ([address] Diabetes mellitus ([facility] [address] Hyperkalemia, Hypertension, Microscopic hematuria, Proteinuria, Renal failure, Status post coronary artery bypass grafting, Type II or unspecified type diabetes mellitus without mention of complication, not stated as uncontrolled, and UTI (urinary tract infection). PSH:   has a past surgical history that includes Cholecystectomy; Cardiac surgery; Coronary artery bypass graft; Cataract extraction, extracapsular w/ intraocular lens implant (Right, 09/01/2022); and Cataract removal (Right, 9/1/2022). Allergies: Allergies   Allergen Reactions    Ace Inhibitors      DECREAESES KIDNEY FUNCTION- ACE AND ARBS        Home Meds:    Prior to Admission medications    Medication Sig Start Date End Date Taking?  Authorizing Provider   omeprazole (PRILOSEC) 40 MG delayed release capsule TAKE 1 CAPSULE BY MOUTH ONCE DAILY FOR 90 DAYS 11/27/22  Yes Historical Provider, MD   BISACODYL 5 MG EC tablet USE AS DIRECTED 12/10/21  Yes Historical Provider, MD   magnesium oxide (MAG-OX) 400 (241.3 Mg) MG TABS tablet TAKE 1 TABLET BY MOUTH ONCE DAILY WITH FOOD FOR 90 DAYS 10/26/20  Yes Historical Provider, MD   Sennosides-Docusate Sodium 8.6-50 MG CAPS Take 2 tablets by mouth daily   Yes Historical Provider, MD [facility] KW[facility]EN [facility] UNIT/ML injection pen Inject 42 Units into the skin daily 5/16/19  Yes Historical Provider, MD   sodium bicarbonate 650 MG tablet Take 650 mg by mouth daily 5/16/19  Yes Historical Provider, MD   ramipril (ALTACE) 2.5 MG capsule Take 1 capsule by mouth daily  Patient taking differently: Take 2.5 mg by mouth daily Hold 3/28/19  Yes Kellie Smith MD   carvedilol (COREG) 6.25 MG tablet Take 1 tablet by mouth 2 times daily  Patient taking differently: Take 3.125 mg by mouth 2 times daily 3/27/19  Yes Kellie Smith MD   pravastatin (PRAVACHOL) 20 MG tablet Take 20 mg by mouth nightly   Yes Historical Provider, MD   aspirin 81 MG EC tablet Take 162 mg by mouth daily    Yes Historical Provider, MD   potassium chloride (KLOR-CON M) 20 MEQ extended release tablet Take 2 tablets by mouth daily for 4 doses Stop KAYLEE PEDROMO [facility] - take potassium Chloride 40mEq today and 40mEq tomorrow recheck potassium on Monday morning 2/24/23 2/28/23  Marivel Bethea MD   ferrous sulfate (IRON 325) 325 (65 Fe) MG tablet Take by mouth daily    Historical Provider, MD   vitamin D3 (CHOLECALCIFEROL) 125 MCG (5000 UT) TABS tablet 1,000 Units daily    Historical Provider, MD   TRUE METRIX BLOOD GLUCOSE TEST strip USE AS DIRECTED ONCE DAILY 10/26/20   Historical Provider, MD   [address] USE TWICE DAILY 10/26/20   Historical Provider, MD   Blood Pressure Monitor KIT Take Bp daily 11/23/20   Marivel Bethea MD        Hospital Meds:    Current Facility-Administered Medications   Medication Dose Route Frequency Provider Last Rate Last Admin    0.9 % sodium chloride infusion   IntraVENous Continuous Elfego Peng MD 75 mL/hr at 03/01/23 0119 New Bag at 03/01/23 0119    sodium chloride flush 0.9 % injection 5-40 mL  5-40 mL IntraVENous 2 times per day BRADY Franklin NP        sodium chloride flush 0.9 % injection 5-40 mL  5-40 mL IntraVENous PRN BRADY Hinton - NP        0.9 % sodium chloride infusion   IntraVENous PRN BRADY Franklin NP        heparin (porcine) injection 5,000 Units  5,000 Units SubCUTAneous 3 times per day BRADY Franklin NP   5,000 Units at 03/01/23 9776    acetaminophen (TYLENOL) tablet 650 mg  650 mg Oral Q6H PRN BRADY Franklin NP        Or    acetaminophen (TYLENOL) suppository 650 mg  650 mg Rectal Q6H PRN BRADY Franklin - NP        aspirin EC tablet 162 mg  162 mg Oral Daily BRADY Hinton NP        insulin glargine (LANTUS) injection vial 42 Units  42 Units SubCUTAneous Daily BRADY Hinton NP        glucose chewable tablet 16 g  4 tablet Oral PRN BRADY Hinton - NP        dextrose bolus 10% 125 mL  125 mL IntraVENous PRN BRADY Hinton NP        Or    dextrose bolus 10% 250 mL  250 mL IntraVENous PRN Parish Berry, APRN - NP        glucagon (rDNA) injection 1 mg  1 mg IntraMUSCular PRN Yissel Bentley, APRN - NP        dextrose 10 % infusion   IntraVENous Continuous PRN Yissel Bentley, APRN - NP        insulin lispro (HUMALOG) injection vial 0-8 Units  0-8 Units SubCUTAneous TID  Yissel Bentley, APRN - NP        insulin lispro (HUMALOG) injection vial 0-4 Units  0-4 Units SubCUTAneous Nightly Yissel Bentley, APRN - NP        bisacodyl (DULCOLAX) EC tablet 5 mg  5 mg Oral Daily PRN Parish Berry, APRN - NP        ferrous sulfate (IRON 325) tablet 325 mg  325 mg Oral Daily with breakfast Parish Berry, APRN - NP        magnesium oxide (MAG-OX) tablet 400 mg  400 mg Oral Daily Yissel Bentley, APRN - NP        pantoprazole (PROTONIX) tablet 40 mg  40 mg Oral QAM  Yissel Bentley, APRN - NP        [START ON 3/2/2023] pravastatin (PRAVACHOL) tablet 20 mg  20 mg Oral Nightly Yissel Bentley, APRN - NP        ramipril (ALTACE) capsule 2.5 mg  2.5 mg Oral Daily Yissel Bentley, APRN - NP        sennosides-docusate sodium (SENOKOT-S) 8.6-50 MG tablet 2 tablet  2 tablet Oral Daily PRN Parish Berry, APRN - NP        sodium bicarbonate tablet 650 mg  650 mg Oral Daily Yissel Bentley, APRN - NP        prochlorperazine (COMPAZINE) injection 10 mg  10 mg IntraVENous Q6H PRN Parish Berry, APRN - NP   10 mg at 03/01/23 0413    hydrALAZINE (APRESOLINE) injection 10 mg  10 mg IntraVENous Q6H PRN Yissel Bentley, APRN - NP        potassium chloride 10 mEq/100 mL IVPB (Peripheral Line)  10 mEq IntraVENous Q1H Mary De Guzman MD        carvedilol (COREG) tablet 6.25 mg  6.25 mg Oral BID  Richard R Schoenrock, APRN - CNP           Social History:       TOBACCO:   reports that she has never smoked. She has never used smokeless tobacco.  ETOH:   reports no history of alcohol use. DRUGS:  reports no history of drug use.   OCCUPATION:          Family Histroy:         Problem Relation Age of Onset    Heart Attack Mother Diabetes Mother     Diabetes Father            Review of Systems:   Constitutional: there has been no unanticipated weight loss. There's been no change in energy level, sleep pattern, or activity level.     Eyes: No visual changes or diplopia. No scleral icterus.  ENT: No Headaches, hearing loss or vertigo. No mouth sores or sore throat.  Cardiovascular: No chest pain, positive dyspnea on exertion, palpitations or loss of consciousness. No cough, hemoptysis, pleuritic pain, or phlebitis.  Respiratory: No cough or wheezing, no sputum production. No hematemesis.    Gastrointestinal: No abdominal pain, appetite loss, blood in stools. No change in bowel or bladder habits.  Genitourinary: No dysuria, trouble voiding, or hematuria.  Musculoskeletal:  No gait disturbance, weakness or joint complaints.  Integumentary: No rash or pruritis.  Neurological: No headache, diplopia, change in muscle strength, numbness or tingling. No change in gait, balance, coordination, mood, affect, memory, mentation, behavior.  Psychiatric: No anxiety, or depression.  Endocrine: No temperature intolerance. No excessive thirst, fluid intake, or urination. No tremor.  Hematologic/Lymphatic: No abnormal bruising or bleeding, blood clots or swollen lymph nodes.  Allergic/Immunologic: No nasal congestion or hives.       Physical Exam    Vital Signs: BP (!) 116/54   Pulse 91   Temp 98.1 °F (36.7 °C) (Oral)   Resp 16   Ht 5' (1.524 m)   Wt 126 lb (57.2 kg)   SpO2 95%   BMI 24.61 kg/m²        Admission Weight: 126 lb (57.2 kg)     General appearance: Awake, Alert Cooperative    Head: Normocephalic, without obvious abnormality, atraumatic    Eyes: Conjunctivae/corneas clear. PERRL, EOM's intact. Fundi benign    Neck: no adenopathy, no carotid bruit, no JVD, supple, symmetrical, trachea midline and thyroid: not enlarged, symmetric, no tenderness/mass/nodules    Lungs: clear diminished to auscultation bilaterally    Heart: regular rate and rhythm,  S1, S2 normal, no murmur, click, rub or gallop    Abdomen: Soft, non-tender. Bowel sounds normal. No masses,  no organomegaly    Extremities: extremities normal, atraumatic, no cyanosis or edema    Skin: Skin color, texture, turgor normal. No rashes or lesions    Neurologic: Grossly normal        MEDICAL DECISION MAKING/TESTING    Cardiac Cath:      Echo/Stress:    Narrative    · Low normal to mildly reduced left ventricular ejection fraction   · The apex is hypokinetic     Left Ventricle   Normal left ventricular end-diastolic dimension. Normal left ventricular wall thickness. The estimated left ventricular ejection fraction is 50-55%. Low normal to mildly reduced left ventricular ejection fraction. The apex is hypokinetic. Grade I (impaired relaxation) left ventricular diastolic dysfunction. Right Ventricle   Normal size right ventricle. Normal right ventricular systolic function. Mitral Valve   The mitral valve structure appears normal. No mitral valve stenosis. Mild mitral regurgitation. Tricuspid Valve   The tricuspid valve structure is normal. No tricuspid valve stenosis. Mild tricuspid valve regurgitation. Aortic Valve   The aortic valve structure is normal. No aortic stenosis. Trace aortic valve regurgitation. Pulmonic Valve   The pulmonic valve is not well visualized. No pulmonic stenosis. No pulmonic regurgitation. Pericardium   No pericardial effusion. Atrial Findings   Left atrium is mildly dilated. Left atrial volume index is 30 mL/m2. Right atrium is normal in size. Great Vessels   The aortic root is normal.      EKG:        CXR:       Labs:      CBC:   Recent Labs     02/28/23 2345   WBC 6.2   HGB 12.6   HCT 37.4   MCV 79.4*        BMP:   Recent Labs     02/28/23  2345 03/01/23  0633   * 121*   K 2.8* 2.8*   CL 89* 87*   CO2 20 18*   BUN 35* 35*   CREATININE 3.50* 3.49*     PT/INR: No results for input(s): PROTIME, INR in the last 72 hours.   APTT: No results for input(s): APTT in the last 72 hours. MAG:   Recent Labs     02/28/23  2345 03/01/23  0633   MG 1.5* 1.8     D Dimer: No results for input(s): DDIMER in the last 72 hours. Troponin T No results for input(s): TROPONINT in the last 72 hours. ProBNP Invalid input(s): PRO-BNP          Diagnosis:  Principal Problem:    Acute kidney injury superimposed on chronic kidney disease ([facility] [address]  Resolved Problems:    * No resolved hospital problems. *    1. hypertensive urgency  -Currently with much better control  -No longer on nifedipine drip  -will discontinue p.o. nifedipine, resume home p.o. Coreg 6.25 mg twice daily    2. Hypokalemia  -Potassium is still 2.8  -Place per sliding scale  -Management per nephrology    3. Multivessel CAD status post CABG  -Denies any chest pain. -EKG unremarkable    4. Acute on chronic kidney disease stage IV  -Creatinine 3.49  -Allergy following    5. Hypomagnesemia  -Morning magnesium 1.5  -Replace per sliding scale    Plan:    Overall patient stable from a cardiac standpoint. Blood pressures are much better controlled. Will resume patient's home antihypertensives. Discontinue nifedipine and replace with home carvedilol. Continue monitoring blood pressure for systolic less than 084 consistently. Monitor telemetry for any significant arrhythmias. Monitor electrolytes and replace as appropriate. Ideally keep potassium greater than 4 and magnesium greater than 2. No plans for any further cardiac work-up at this time. We will continue to monitor. If blood pressures remain stable, will likely sign off tomorrow with follow-up as an outpatient. Addendum:    Agree with GREG note and findings. Presented with flank pain. BP markedly elevated but nausea at the time. BP improved. Renal following for OREN. No CP or SOB.     BP (!) 116/54   Pulse 91   Temp 98.1 °F (36.7 °C) (Oral)   Resp 16   Ht 5' (1.524 m)   Wt 126 lb (57.2 kg)   SpO2 95%   BMI 24.61 kg/m²   RRR  CTAB  No edema  Alert    Impression:   OREN/CKD  Hypertensive urgency, resolved  CAD s/p CABG  Chronic recurrent hyperkalemia for notes  Normal LVEF 2019 SPECT    Continue Coreg/CCB    Renal following    No major inpatient CV plans no

## 2023-03-06 NOTE — PROVIDER CONTACT NOTE (CRITICAL VALUE NOTIFICATION) - ACTION/TREATMENT ORDERED:
2nd piv established, 20g to left cephalic vein. stat labs drawn and sent to lab. cardiac monitoring initiated d/t K+ pf 2.9

## 2023-03-07 DIAGNOSIS — J18.9 PNEUMONIA, UNSPECIFIED ORGANISM: ICD-10-CM

## 2023-03-07 DIAGNOSIS — J96.01 ACUTE RESPIRATORY FAILURE WITH HYPOXIA: ICD-10-CM

## 2023-03-07 DIAGNOSIS — U07.1 COVID-19: ICD-10-CM

## 2023-03-07 DIAGNOSIS — E87.6 HYPOKALEMIA: ICD-10-CM

## 2023-03-07 DIAGNOSIS — R74.01 ELEVATION OF LEVELS OF LIVER TRANSAMINASE LEVELS: ICD-10-CM

## 2023-03-07 LAB
ALBUMIN SERPL ELPH-MCNC: 3.3 G/DL — SIGNIFICANT CHANGE UP (ref 3.3–5.2)
ALP SERPL-CCNC: 91 U/L — SIGNIFICANT CHANGE UP (ref 40–120)
ALT FLD-CCNC: 152 U/L — HIGH
ANION GAP SERPL CALC-SCNC: 12 MMOL/L — SIGNIFICANT CHANGE UP (ref 5–17)
APTT BLD: 28.8 SEC — SIGNIFICANT CHANGE UP (ref 27.5–35.5)
AST SERPL-CCNC: 79 U/L — HIGH
BASOPHILS # BLD AUTO: 0 K/UL — SIGNIFICANT CHANGE UP (ref 0–0.2)
BASOPHILS NFR BLD AUTO: 0 % — SIGNIFICANT CHANGE UP (ref 0–2)
BILIRUB SERPL-MCNC: 0.3 MG/DL — LOW (ref 0.4–2)
BUN SERPL-MCNC: 17.6 MG/DL — SIGNIFICANT CHANGE UP (ref 8–20)
CALCIUM SERPL-MCNC: 8.9 MG/DL — SIGNIFICANT CHANGE UP (ref 8.4–10.5)
CHLORIDE SERPL-SCNC: 99 MMOL/L — SIGNIFICANT CHANGE UP (ref 96–108)
CO2 SERPL-SCNC: 29 MMOL/L — SIGNIFICANT CHANGE UP (ref 22–29)
CREAT SERPL-MCNC: 0.81 MG/DL — SIGNIFICANT CHANGE UP (ref 0.5–1.3)
EGFR: 102 ML/MIN/1.73M2 — SIGNIFICANT CHANGE UP
EOSINOPHIL # BLD AUTO: 0 K/UL — SIGNIFICANT CHANGE UP (ref 0–0.5)
EOSINOPHIL NFR BLD AUTO: 0 % — SIGNIFICANT CHANGE UP (ref 0–6)
GIANT PLATELETS BLD QL SMEAR: PRESENT — SIGNIFICANT CHANGE UP
GLUCOSE SERPL-MCNC: 174 MG/DL — HIGH (ref 70–99)
HCT VFR BLD CALC: 38.1 % — LOW (ref 39–50)
HGB BLD-MCNC: 13.4 G/DL — SIGNIFICANT CHANGE UP (ref 13–17)
INR BLD: 1.17 RATIO — HIGH (ref 0.88–1.16)
LYMPHOCYTES # BLD AUTO: 1.23 K/UL — SIGNIFICANT CHANGE UP (ref 1–3.3)
LYMPHOCYTES # BLD AUTO: 13.2 % — SIGNIFICANT CHANGE UP (ref 13–44)
LYMPHOCYTES # SPEC AUTO: 7.5 % — HIGH (ref 0–0)
MAGNESIUM SERPL-MCNC: 2.2 MG/DL — SIGNIFICANT CHANGE UP (ref 1.6–2.6)
MANUAL SMEAR VERIFICATION: SIGNIFICANT CHANGE UP
MCHC RBC-ENTMCNC: 29.1 PG — SIGNIFICANT CHANGE UP (ref 27–34)
MCHC RBC-ENTMCNC: 35.2 GM/DL — SIGNIFICANT CHANGE UP (ref 32–36)
MCV RBC AUTO: 82.8 FL — SIGNIFICANT CHANGE UP (ref 80–100)
MONOCYTES # BLD AUTO: 0.78 K/UL — SIGNIFICANT CHANGE UP (ref 0–0.9)
MONOCYTES NFR BLD AUTO: 8.4 % — SIGNIFICANT CHANGE UP (ref 2–14)
NEUTROPHILS # BLD AUTO: 5.78 K/UL — SIGNIFICANT CHANGE UP (ref 1.8–7.4)
NEUTROPHILS NFR BLD AUTO: 49.8 % — SIGNIFICANT CHANGE UP (ref 43–77)
NEUTS BAND # BLD: 12.3 % — HIGH (ref 0–8)
PLAT MORPH BLD: NORMAL — SIGNIFICANT CHANGE UP
PLATELET # BLD AUTO: 226 K/UL — SIGNIFICANT CHANGE UP (ref 150–400)
POLYCHROMASIA BLD QL SMEAR: SLIGHT — SIGNIFICANT CHANGE UP
POTASSIUM SERPL-MCNC: 3.7 MMOL/L — SIGNIFICANT CHANGE UP (ref 3.5–5.3)
POTASSIUM SERPL-SCNC: 3.7 MMOL/L — SIGNIFICANT CHANGE UP (ref 3.5–5.3)
PROT SERPL-MCNC: 6.3 G/DL — LOW (ref 6.6–8.7)
PROTHROM AB SERPL-ACNC: 13.6 SEC — HIGH (ref 10.5–13.4)
RBC # BLD: 4.6 M/UL — SIGNIFICANT CHANGE UP (ref 4.2–5.8)
RBC # FLD: 13.2 % — SIGNIFICANT CHANGE UP (ref 10.3–14.5)
RBC BLD AUTO: ABNORMAL
SODIUM SERPL-SCNC: 140 MMOL/L — SIGNIFICANT CHANGE UP (ref 135–145)
VARIANT LYMPHS # BLD: 8.8 % — HIGH (ref 0–6)
WBC # BLD: 9.3 K/UL — SIGNIFICANT CHANGE UP (ref 3.8–10.5)
WBC # FLD AUTO: 9.3 K/UL — SIGNIFICANT CHANGE UP (ref 3.8–10.5)

## 2023-03-07 PROCEDURE — 99233 SBSQ HOSP IP/OBS HIGH 50: CPT

## 2023-03-07 RX ORDER — ENOXAPARIN SODIUM 100 MG/ML
100 INJECTION SUBCUTANEOUS EVERY 12 HOURS
Refills: 0 | Status: DISCONTINUED | OUTPATIENT
Start: 2023-03-07 | End: 2023-03-09

## 2023-03-07 RX ADMIN — REMDESIVIR 200 MILLIGRAM(S): 5 INJECTION INTRAVENOUS at 17:58

## 2023-03-07 RX ADMIN — BUDESONIDE AND FORMOTEROL FUMARATE DIHYDRATE 2 PUFF(S): 160; 4.5 AEROSOL RESPIRATORY (INHALATION) at 08:57

## 2023-03-07 RX ADMIN — BUDESONIDE AND FORMOTEROL FUMARATE DIHYDRATE 2 PUFF(S): 160; 4.5 AEROSOL RESPIRATORY (INHALATION) at 20:38

## 2023-03-07 RX ADMIN — Medication 100 MILLIGRAM(S): at 05:22

## 2023-03-07 RX ADMIN — ENOXAPARIN SODIUM 40 MILLIGRAM(S): 100 INJECTION SUBCUTANEOUS at 05:21

## 2023-03-07 RX ADMIN — ENOXAPARIN SODIUM 100 MILLIGRAM(S): 100 INJECTION SUBCUTANEOUS at 20:48

## 2023-03-07 RX ADMIN — Medication 50 MILLIGRAM(S): at 05:21

## 2023-03-07 RX ADMIN — ALBUTEROL 2 PUFF(S): 90 AEROSOL, METERED ORAL at 12:15

## 2023-03-07 RX ADMIN — AMLODIPINE BESYLATE 5 MILLIGRAM(S): 2.5 TABLET ORAL at 05:22

## 2023-03-07 RX ADMIN — ALBUTEROL 2 PUFF(S): 90 AEROSOL, METERED ORAL at 05:21

## 2023-03-07 RX ADMIN — ALBUTEROL 2 PUFF(S): 90 AEROSOL, METERED ORAL at 20:38

## 2023-03-07 RX ADMIN — ALBUTEROL 2 PUFF(S): 90 AEROSOL, METERED ORAL at 08:55

## 2023-03-07 RX ADMIN — ALBUTEROL 2 PUFF(S): 90 AEROSOL, METERED ORAL at 15:55

## 2023-03-07 RX ADMIN — Medication 6 MILLIGRAM(S): at 05:25

## 2023-03-07 RX ADMIN — ALBUTEROL 2 PUFF(S): 90 AEROSOL, METERED ORAL at 01:39

## 2023-03-08 LAB
ALBUMIN SERPL ELPH-MCNC: 3.3 G/DL — SIGNIFICANT CHANGE UP (ref 3.3–5.2)
ALP SERPL-CCNC: 89 U/L — SIGNIFICANT CHANGE UP (ref 40–120)
ALT FLD-CCNC: 151 U/L — HIGH
ANION GAP SERPL CALC-SCNC: 13 MMOL/L — SIGNIFICANT CHANGE UP (ref 5–17)
APPEARANCE UR: CLEAR — SIGNIFICANT CHANGE UP
AST SERPL-CCNC: 65 U/L — HIGH
BACTERIA # UR AUTO: ABNORMAL
BASOPHILS # BLD AUTO: 0.07 K/UL — SIGNIFICANT CHANGE UP (ref 0–0.2)
BASOPHILS NFR BLD AUTO: 0.7 % — SIGNIFICANT CHANGE UP (ref 0–2)
BILIRUB SERPL-MCNC: 0.3 MG/DL — LOW (ref 0.4–2)
BILIRUB UR-MCNC: NEGATIVE — SIGNIFICANT CHANGE UP
BUN SERPL-MCNC: 20.1 MG/DL — HIGH (ref 8–20)
CALCIUM SERPL-MCNC: 8.7 MG/DL — SIGNIFICANT CHANGE UP (ref 8.4–10.5)
CHLORIDE SERPL-SCNC: 98 MMOL/L — SIGNIFICANT CHANGE UP (ref 96–108)
CO2 SERPL-SCNC: 27 MMOL/L — SIGNIFICANT CHANGE UP (ref 22–29)
COLOR SPEC: YELLOW — SIGNIFICANT CHANGE UP
CREAT SERPL-MCNC: 0.63 MG/DL — SIGNIFICANT CHANGE UP (ref 0.5–1.3)
CRP SERPL-MCNC: 49 MG/L — HIGH
D DIMER BLD IA.RAPID-MCNC: 1610 NG/ML DDU — HIGH
DIFF PNL FLD: ABNORMAL
EGFR: 110 ML/MIN/1.73M2 — SIGNIFICANT CHANGE UP
EOSINOPHIL # BLD AUTO: 0.01 K/UL — SIGNIFICANT CHANGE UP (ref 0–0.5)
EOSINOPHIL NFR BLD AUTO: 0.1 % — SIGNIFICANT CHANGE UP (ref 0–6)
EPI CELLS # UR: SIGNIFICANT CHANGE UP
FERRITIN SERPL-MCNC: 1572 NG/ML — HIGH (ref 30–400)
GLUCOSE SERPL-MCNC: 130 MG/DL — HIGH (ref 70–99)
GLUCOSE UR QL: NEGATIVE MG/DL — SIGNIFICANT CHANGE UP
HADV DNA SPEC QL NAA+PROBE: DETECTED
HCT VFR BLD CALC: 38.9 % — LOW (ref 39–50)
HCV AB S/CO SERPL IA: 0.18 S/CO — SIGNIFICANT CHANGE UP (ref 0–0.99)
HCV AB SERPL-IMP: SIGNIFICANT CHANGE UP
HGB BLD-MCNC: 13.5 G/DL — SIGNIFICANT CHANGE UP (ref 13–17)
IMM GRANULOCYTES NFR BLD AUTO: 0.6 % — SIGNIFICANT CHANGE UP (ref 0–0.9)
INR BLD: 1.14 RATIO — SIGNIFICANT CHANGE UP (ref 0.88–1.16)
KETONES UR-MCNC: NEGATIVE — SIGNIFICANT CHANGE UP
LEUKOCYTE ESTERASE UR-ACNC: NEGATIVE — SIGNIFICANT CHANGE UP
LYMPHOCYTES # BLD AUTO: 4.04 K/UL — HIGH (ref 1–3.3)
LYMPHOCYTES # BLD AUTO: 41.6 % — SIGNIFICANT CHANGE UP (ref 13–44)
MCHC RBC-ENTMCNC: 29.2 PG — SIGNIFICANT CHANGE UP (ref 27–34)
MCHC RBC-ENTMCNC: 34.7 GM/DL — SIGNIFICANT CHANGE UP (ref 32–36)
MCV RBC AUTO: 84.2 FL — SIGNIFICANT CHANGE UP (ref 80–100)
MONOCYTES # BLD AUTO: 1.01 K/UL — HIGH (ref 0–0.9)
MONOCYTES NFR BLD AUTO: 10.4 % — SIGNIFICANT CHANGE UP (ref 2–14)
MRSA PCR RESULT.: DETECTED
NEUTROPHILS # BLD AUTO: 4.52 K/UL — SIGNIFICANT CHANGE UP (ref 1.8–7.4)
NEUTROPHILS NFR BLD AUTO: 46.6 % — SIGNIFICANT CHANGE UP (ref 43–77)
NITRITE UR-MCNC: NEGATIVE — SIGNIFICANT CHANGE UP
PH UR: 6.5 — SIGNIFICANT CHANGE UP (ref 5–8)
PLATELET # BLD AUTO: 293 K/UL — SIGNIFICANT CHANGE UP (ref 150–400)
POTASSIUM SERPL-MCNC: 3.2 MMOL/L — LOW (ref 3.5–5.3)
POTASSIUM SERPL-SCNC: 3.2 MMOL/L — LOW (ref 3.5–5.3)
PROT SERPL-MCNC: 6.5 G/DL — LOW (ref 6.6–8.7)
PROT UR-MCNC: 30 MG/DL
PROTHROM AB SERPL-ACNC: 13.3 SEC — SIGNIFICANT CHANGE UP (ref 10.5–13.4)
RAPID RVP RESULT: DETECTED
RBC # BLD: 4.62 M/UL — SIGNIFICANT CHANGE UP (ref 4.2–5.8)
RBC # FLD: 13.3 % — SIGNIFICANT CHANGE UP (ref 10.3–14.5)
RBC CASTS # UR COMP ASSIST: SIGNIFICANT CHANGE UP /HPF (ref 0–4)
S AUREUS DNA NOSE QL NAA+PROBE: DETECTED
SARS-COV-2 RNA SPEC QL NAA+PROBE: DETECTED
SODIUM SERPL-SCNC: 138 MMOL/L — SIGNIFICANT CHANGE UP (ref 135–145)
SP GR SPEC: 1.01 — SIGNIFICANT CHANGE UP (ref 1.01–1.02)
UROBILINOGEN FLD QL: NEGATIVE MG/DL — SIGNIFICANT CHANGE UP
WBC # BLD: 9.71 K/UL — SIGNIFICANT CHANGE UP (ref 3.8–10.5)
WBC # FLD AUTO: 9.71 K/UL — SIGNIFICANT CHANGE UP (ref 3.8–10.5)
WBC UR QL: SIGNIFICANT CHANGE UP /HPF (ref 0–5)

## 2023-03-08 PROCEDURE — 99232 SBSQ HOSP IP/OBS MODERATE 35: CPT

## 2023-03-08 RX ORDER — FLUTICASONE PROPIONATE 50 MCG
1 SPRAY, SUSPENSION NASAL
Refills: 0 | Status: DISCONTINUED | OUTPATIENT
Start: 2023-03-08 | End: 2023-03-09

## 2023-03-08 RX ORDER — MUPIROCIN 20 MG/G
1 OINTMENT TOPICAL
Refills: 0 | Status: DISCONTINUED | OUTPATIENT
Start: 2023-03-08 | End: 2023-03-09

## 2023-03-08 RX ADMIN — MUPIROCIN 1 APPLICATION(S): 20 OINTMENT TOPICAL at 17:31

## 2023-03-08 RX ADMIN — ALBUTEROL 2 PUFF(S): 90 AEROSOL, METERED ORAL at 16:27

## 2023-03-08 RX ADMIN — Medication 6 MILLIGRAM(S): at 05:57

## 2023-03-08 RX ADMIN — AMLODIPINE BESYLATE 5 MILLIGRAM(S): 2.5 TABLET ORAL at 05:56

## 2023-03-08 RX ADMIN — BUDESONIDE AND FORMOTEROL FUMARATE DIHYDRATE 2 PUFF(S): 160; 4.5 AEROSOL RESPIRATORY (INHALATION) at 19:35

## 2023-03-08 RX ADMIN — Medication 1 SPRAY(S): at 17:31

## 2023-03-08 RX ADMIN — ALBUTEROL 2 PUFF(S): 90 AEROSOL, METERED ORAL at 08:36

## 2023-03-08 RX ADMIN — ALBUTEROL 2 PUFF(S): 90 AEROSOL, METERED ORAL at 20:47

## 2023-03-08 RX ADMIN — ALBUTEROL 2 PUFF(S): 90 AEROSOL, METERED ORAL at 00:18

## 2023-03-08 RX ADMIN — BUDESONIDE AND FORMOTEROL FUMARATE DIHYDRATE 2 PUFF(S): 160; 4.5 AEROSOL RESPIRATORY (INHALATION) at 08:36

## 2023-03-08 RX ADMIN — Medication 50 MILLIGRAM(S): at 05:56

## 2023-03-08 RX ADMIN — ENOXAPARIN SODIUM 100 MILLIGRAM(S): 100 INJECTION SUBCUTANEOUS at 17:31

## 2023-03-08 RX ADMIN — ENOXAPARIN SODIUM 100 MILLIGRAM(S): 100 INJECTION SUBCUTANEOUS at 05:56

## 2023-03-08 RX ADMIN — REMDESIVIR 200 MILLIGRAM(S): 5 INJECTION INTRAVENOUS at 17:32

## 2023-03-08 NOTE — PROGRESS NOTE ADULT - ASSESSMENT
56 y/o M w/ PMH of prostate CA (not on tx), HTN, came in c/o sob w/ associated productive cough and subjective fevers found to be covid positive.  Pt on isolation precautions and started on remdesivir and decadron on admisison.  Pt now on supplemental O2 for desaturation overnight.         Sepsis poa 2/2 multifocal pna 2/2 covid pna   Acute hypoxic respiratory failure 2/2 Covid PNA  - Now on supplemental O2; CTA (-) for PE   - Leukopenia likley 2/2 acute infection and bandemia decreasing   - c/w remdesivir and decadron day 3   - Monitor renal fxn and LFTs while on remdesivir  - Monitor inflamatory markers [id number]  - Mild LA of 2.1 from sepsis, repeat resolved   - Transaminities likely 2/2 sepsis and trending down; monitor LFTs given on remdesivir   - Blood cxs ordered by ED; negative to date and f/u sputum cultures  - Monitor  and titrate O2 to maintain O2 sats >92%  - Bronchodiolators and mucolytics as needed   - Encourage Incentive spirometer   - Monitor CBC and temperature       Transaminitis likely 2/2 sepsis vs Alcohol abuse  - Trending down   - ALP and tbili remain nL  - Asymptomatic and benign abd exam   - Montior LFTs closely given on remdesivir   - Trend LFTs     Hypokalemia   - Supplemented overnight   - Will repeat BMP  - Maintain K>4    HTN   - Resume home meds     VTE ppx: lovenox (increase to 1mg/kg q12 given D-Dimer>2xUL and BMI>30        
56 y/o M w/ PMH of prostate CA (not on tx), HTN, came in c/o sob w/ associated productive cough and subjective fevers found to be covid positive.  Pt on isolation precautions and started on remdesivir and decadron on admisison.  Pt now on supplemental O2 for desaturation overnight.         Sepsis poa 2/2 multifocal pna 2/2 covid pna   Acute hypoxic respiratory failure 2/2 Covid PNA  - Now on supplemental O2; CTA (-) for PE   - Leukopenia likley 2/2 acute infection and bandemia decreasing   - c/w remdesivir and decadron day 2   - Monitor renal fxn and LFTs while on remdesivir  - Monitor inflamatory markers [id number]  - Mild LA of 2.1 from sepsis, repeat resolved   - Transaminities likely 2/2 sepsis and trending down; monitor LFTs given on remdesivir   - Blood cxs ordered by ED; f/u results and f/u sputum cultures  - Monitor  and titrate O2 to maintain O2 sats >92%  - Bronchodiolators and mucolytics as needed   - Encourage Incentive spirometer   - Monitor CBC and temperature       Transaminitis likely 2/2 sepsis   - Trending down   - ALP and tbili remain nL  - Asymptomatic and benign abd exam   - Montior LFTs closely given on remdesivir   - Trend LFTs       Hypokalemia   - Supplemented overnight   - Will repeat BMP  - Maintain K>4      HTN   - Resume home meds         VTE ppx: lovenox        
56 y/o M w/ PMH of prostate CA (not on tx), HTN, came in c/o sob w/ associated productive cough and subjective fevers found to be covid positive.  Pt on isolation precautions and started on remdesivir and decadron on admisison.  Pt now on supplemental O2 for desaturation overnight.         Sepsis poa 2/2 multifocal pna 2/2 covid pna   Acute hypoxic respiratory failure 2/2 Covid PNA  - off O2 but does go down below 90 on ambulation  - improves with sitting and deep breathing  - c/w remdesivir and decadron day 4  - has a bronchospastic cough and post nasal drip  - will add nasal spray flonase BID  - Monitor renal fxn and LFTs while on remdesivir  - Transaminities likely 2/2 sepsis/COVID and trending down; monitor LFTs given on remdesivir   - Blood cxs ordered by ED; negative to date and f/u sputum cultures  - Monitor  and titrate O2 to maintain O2 sats >92%  - Bronchodiolators and mucolytics as needed   - Encourage Incentive spirometer and OOB  - Monitor CBC and temperature     Transaminitis likely 2/2 sepsis vs Alcohol abuse  - Trending down   - ALP and tbili remain nL  - Asymptomatic and benign abd exam   - Montior LFTs closely given on remdesivir   - Trend LFTs     HTN   - Resume home meds     VTE ppx: lovenox (increase to 1mg/kg q12 given D-Dimer>2xUL and BMI>30    DISPO: anticipate discharge tomorrow

## 2023-03-08 NOTE — PROGRESS NOTE ADULT - SUBJECTIVE AND OBJECTIVE BOX
HOSPITALIST PROGRESS NOTE    ADELAIDE SOUSA  [id number]  57yMale    Patient is a 57y old  Male who presents with a chief complaint of covid PNA (06 Mar 2023 15:35)      SUBJECTIVE:   Chart reviewed since last visit.  Patient seen and examined at bedside for COVID Pneumonia  Coughing a lot - unable to bring up phlegm  Feels better than yesterday, improved dyspnea  No fever, chills      OBJECTIVE:  Vital Signs Last 24 Hrs  T(C): 36.9 (07 Mar 2023 12:35), Max: 36.9 (06 Mar 2023 16:20)  T(F): 98.4 (07 Mar 2023 12:35), Max: 98.5 (06 Mar 2023 20:21)  HR: 94 (07 Mar 2023 12:35) (86 - 104)  BP: 122/70 (07 Mar 2023 12:35) (118/69 - 157/84)   RR: 18 (07 Mar 2023 12:35) (18 - 20)  SpO2: 94% (07 Mar 2023 12:35) (94% - 99%)    Parameters below as of 07 Mar 2023 12:35  Patient On (Oxygen Delivery Method): nasal cannula  O2 Flow (L/min): 2      PHYSICAL EXAMINATION  BMI 32  General: Obese male sitting up in chair, NAD  HEENT:  3LNC  NECK:  Supple with mild accessory muscle use  CVS: regular rate and rhythm S1 S2  RESP:  Fair air entry; bilateral basilar crackles  GI:  Soft nondistended nontender BS+  : No suprapubic tenderness  MSK:  FROM  CNS:  No gross focal or global deficit appreciated  INTEG:  warm dry skin  PSYCH:  Fair mood, oriented x 3    MONITOR:  CAPILLARY BLOOD GLUCOSE            I&O's Summary    06 Mar 2023 07:01  -  07 Mar 2023 07:00  --------------------------------------------------------  IN: 240 mL / OUT: 500 mL / NET: -260 mL                            13.4   9.30  )-----------( 226      ( 07 Mar 2023 04:33 )             38.1     PT/INR - ( 07 Mar 2023 04:33 )   PT: 13.6 sec;   INR: 1.17 ratio      D-Dimer Assay, Quantitative: 2146:      PTT - ( 07 Mar 2023 04:33 )  PTT:28.8 sec  03-07    140  |  99  |  17.6  ----------------------------<  174<H>  3.7   |  29.0  |  0.81    Ca    8.9      07 Mar 2023 04:33  Mg     2.2     03-07    TPro  6.3<L>  /  Alb  3.3  /  TBili  0.3<L>  /  DBili  x   /  AST  79<H>  /  ALT  152<H>  /  AlkPhos  91  03-07      r      Culture:    TTE:    RADIOLOGY  < from: CT Chest No Cont (03.05.23 @ 13:51) >  IMPRESSION: Bilateral lower lung opacities, most confluent within the   right lower lobe as described above suggestive of pneumonia with   endobronchial spread.    Associated mediastinal and right hilar lymph nodes as above are likely   reactive.    Comparison with prior studies are recommended and if images are made   available, an addendum can be issued. Alternatively a 1-3 month follow-up   noncontrast chest CT is recommended to ensure resolution.    --- End of Report ---    < end of copied text >    < from: CT Angio Chest PE Protocol w/ IV Cont (03.06.23 @ 06:49) >    IMPRESSION:    No pulmonary embolus.    Right greater than the left bibasilar consolidations and right hilar   lymphadenopathy. Recommend follow-up chest CT in 4 weeks to determine   resolution.    --- End of Report ---    < end of copied text >      MEDICATIONS  (STANDING):  albuterol    90 MICROgram(s) HFA Inhaler 2 Puff(s) Inhalation every 4 hours  amLODIPine   Tablet 5 milliGRAM(s) Oral daily  budesonide 160 MICROgram(s)/formoterol 4.5 MICROgram(s) Inhaler 2 Puff(s) Inhalation two times a day  dexAMETHasone  Injectable 6 milliGRAM(s) IV Push daily  enoxaparin Injectable 100 milliGRAM(s) SubCutaneous every 12 hours  metoprolol succinate ER 50 milliGRAM(s) Oral daily  remdesivir  IVPB   IV Intermittent   remdesivir  IVPB 100 milliGRAM(s) IV Intermittent every 24 hours      MEDICATIONS  (PRN):  acetaminophen     Tablet .. 650 milliGRAM(s) Oral every 6 hours PRN Temp greater or equal to 38C (100.4F), Mild Pain (1 - 3)  aluminum hydroxide/magnesium hydroxide/simethicone Suspension 30 milliLiter(s) Oral every 4 hours PRN Dyspepsia  benzonatate 100 milliGRAM(s) Oral every 8 hours PRN Cough  guaiFENesin Oral Liquid (Sugar-Free) 200 milliGRAM(s) Oral every 6 hours PRN Cough  melatonin 3 milliGRAM(s) Oral at bedtime PRN Insomnia  
  Chief Complaint:  covid pna    SUBJECTIVE / OVERNIGHT EVENTS:  placed on supplemental O2 overnight.  pt tolerating on 2L NC.  Speaking 3-4 word sentences.  Still w/ sob and cough but no other acute complaints a this time.          I&O's Summary    06 Mar 2023 07:01  -  06 Mar 2023 15:36  --------------------------------------------------------  IN: 240 mL / OUT: 500 mL / NET: -260 mL          PHYSICAL EXAM:  Vital Signs Last 24 Hrs  T(C): 36.8 (06 Mar 2023 11:50), Max: 39.4 (05 Mar 2023 23:36)  T(F): 98.2 (06 Mar 2023 11:50), Max: 103 (05 Mar 2023 23:36)  HR: 100 (06 Mar 2023 11:50) (94 - 125)  BP: 136/90 (06 Mar 2023 11:50) (131/77 - 142/84)  BP(mean): --  RR: 20 (06 Mar 2023 11:50) (20 - 20)  SpO2: 96% (06 Mar 2023 11:50) (90% - 96%)    Parameters below as of 06 Mar 2023 11:50  Patient On (Oxygen Delivery Method): nasal cannula  O2 Flow (L/min): 2      GENERAL: pt examined bedside, mild distress, speaking 3-4 word sentences   HEENT: NC/AT, moist oral mucosa, clear conjunctiva, sclera nonicteric  RESPIRATORY: poor inspiratory effort, no use of accessories, scattered basilar rales  CARDIOVASCULAR: RRR, normal S1 and S2, no murmur/rub/gallop  ABDOMEN: soft, NT/ND, normoactive bowel sounds, no rebound/guarding  MUSCLOSKELETAL:  no joint swelling or tenderness to palpation  EXTREMITIES: No cynaosis, no clubbing, no lower extremity edema  NEUROLOGY: A+O to person, place, and time, no focal neurologic deficits appreciated   SKIN: No rashes or no palpable lesions        LABS:                        13.6   2.39  )-----------( 165      ( 06 Mar 2023 03:30 )             38.4     03-06    138  |  99  |  12.1  ----------------------------<  171<H>  3.4<L>   |  23.0  |  0.75    Ca    8.6      06 Mar 2023 03:30    TPro  6.7  /  Alb  3.3  /  TBili  0.4  /  DBili  x   /  AST  156<H>  /  ALT  189<H>  /  AlkPhos  97  03-06    PT/INR - ( 06 Mar 2023 03:10 )   PT: 13.8 sec;   INR: 1.19 ratio          Culture - Blood (collected 05 Mar 2023 12:00)  Source: .Blood Blood-Peripheral  Preliminary Report (06 Mar 2023 15:02):    No growth to date.    Culture - Blood (collected 05 Mar 2023 11:50)  Source: .Blood Blood-Peripheral  Preliminary Report (06 Mar 2023 15:02):    No growth to date.      CAPILLARY BLOOD GLUCOSE            RADIOLOGY & ADDITIONAL TESTS:    < from: CT Angio Chest PE Protocol w/ IV Cont (03.06.23 @ 06:49) >  IMPRESSION:    No pulmonary embolus.    Right greater than the left bibasilar consolidations and right hilar   lymphadenopathy. Recommend follow-up chest CT in 4 weeks to determine   resolution.    < end of copied text >        < from: CT Chest No Cont (03.05.23 @ 13:51) >  IMPRESSION: Bilateral lower lung opacities, most confluent within the   right lower lobe as described above suggestive of pneumonia with   endobronchial spread.    Associated mediastinal and right hilar lymph nodes as above are likely   reactive.    Comparison with prior studies are recommended and if images are made   available, an addendum can be issued. Alternatively a 1-3 month follow-up   noncontrast chest CT is recommended to ensure resolution.    < end of copied text >        MEDICATIONS  (STANDING):  albuterol    90 MICROgram(s) HFA Inhaler 2 Puff(s) Inhalation every 4 hours  amLODIPine   Tablet 5 milliGRAM(s) Oral daily  budesonide 160 MICROgram(s)/formoterol 4.5 MICROgram(s) Inhaler 2 Puff(s) Inhalation two times a day  dexAMETHasone  Injectable 6 milliGRAM(s) IV Push daily  enoxaparin Injectable 40 milliGRAM(s) SubCutaneous every 24 hours  metoprolol succinate ER 50 milliGRAM(s) Oral daily  remdesivir  IVPB   IV Intermittent   remdesivir  IVPB 100 milliGRAM(s) IV Intermittent every 24 hours    MEDICATIONS  (PRN):  acetaminophen     Tablet .. 650 milliGRAM(s) Oral every 6 hours PRN Temp greater or equal to 38C (100.4F), Mild Pain (1 - 3)  aluminum hydroxide/magnesium hydroxide/simethicone Suspension 30 milliLiter(s) Oral every 4 hours PRN Dyspepsia  benzonatate 100 milliGRAM(s) Oral every 8 hours PRN Cough  guaiFENesin Oral Liquid (Sugar-Free) 200 milliGRAM(s) Oral every 6 hours PRN Cough  melatonin 3 milliGRAM(s) Oral at bedtime PRN Insomnia                                  
CC: PNA (07 Mar 2023 16:03)    INTERVAL HPI/OVERNIGHT EVENTS:  no acute events    Vital Signs Last 24 Hrs  T(C): 36.7 (08 Mar 2023 10:58), Max: 36.9 (07 Mar 2023 12:35)  T(F): 98 (08 Mar 2023 10:58), Max: 98.4 (07 Mar 2023 12:35)  HR: 86 (08 Mar 2023 10:58) (83 - 101)  BP: 117/54 (08 Mar 2023 10:58) (117/54 - 149/91)  BP(mean): --  RR: 20 (08 Mar 2023 10:58) (18 - 20)  SpO2: 93% (08 Mar 2023 10:58) (93% - 96%)    Parameters below as of 08 Mar 2023 05:01  Patient On (Oxygen Delivery Method): room air    PHYSICAL EXAM:  General: in no acute distress  Eyes: PERRLA, EOMI; conjunctiva and sclera clear  Head: Normocephalic; atraumatic  ENMT: No nasal discharge; airway clear  Neck: Supple; non tender; no masses  Respiratory: No wheezes, rales or rhonchi  Cardiovascular: Regular rate and rhythm. S1 and S2 Normal; No murmurs, gallops or rubs  Gastrointestinal: Soft non-tender non-distended; Normal bowel sounds  Genitourinary: No costovertebral angle tenderness  Extremities: Normal range of motion, No clubbing, cyanosis or edema  Vascular: Peripheral pulses palpable 2+ bilaterally  Neurological: Alert and oriented x4  Skin: Warm and dry. No acute rash  Psychiatric: Cooperative and appropriate    I&O's Detail               13.5   9.71  )-----------( 293      ( 08 Mar 2023 06:00 )             38.9     08 Mar 2023 06:00    138    |  98     |  20.1   ----------------------------<  130    3.2     |  27.0   |  0.63     Ca    8.7        08 Mar 2023 06:00  Mg     2.2       07 Mar 2023 04:33    TPro  6.5    /  Alb  3.3    /  TBili  0.3    /  DBili  x      /  AST  65     /  ALT  151    /  AlkPhos  89     08 Mar 2023 06:00    PT/INR - ( 08 Mar 2023 06:00 )   PT: 13.3 sec;   INR: 1.14 ratio      PTT - ( 07 Mar 2023 04:33 )  PTT:28.8 sec    CAPILLARY BLOOD GLUCOSE    LIVER FUNCTIONS - ( 08 Mar 2023 06:00 )  Alb: 3.3 g/dL / Pro: 6.5 g/dL / ALK PHOS: 89 U/L / ALT: 151 U/L / AST: 65 U/L / GGT: x           Urinalysis Basic - ( 08 Mar 2023 01:20 )    Color: Yellow / Appearance: Clear / S.015 / pH: x  Gluc: x / Ketone: Negative  / Bili: Negative / Urobili: Negative mg/dL   Blood: x / Protein: 30 mg/dL / Nitrite: Negative   Leuk Esterase: Negative / RBC: 0-2 /HPF / WBC 0-2 /HPF   Sq Epi: x / Non Sq Epi: Occasional / Bacteria: Occasional    MEDICATIONS  (STANDING):  albuterol    90 MICROgram(s) HFA Inhaler 2 Puff(s) Inhalation every 4 hours  amLODIPine   Tablet 5 milliGRAM(s) Oral daily  budesonide 160 MICROgram(s)/formoterol 4.5 MICROgram(s) Inhaler 2 Puff(s) Inhalation two times a day  dexAMETHasone  Injectable 6 milliGRAM(s) IV Push daily  enoxaparin Injectable 100 milliGRAM(s) SubCutaneous every 12 hours  fluticasone propionate 50 MICROgram(s)/spray Nasal Spray 1 Spray(s) Both Nostrils two times a day  metoprolol succinate ER 50 milliGRAM(s) Oral daily  remdesivir  IVPB   IV Intermittent   remdesivir  IVPB 100 milliGRAM(s) IV Intermittent every 24 hours    MEDICATIONS  (PRN):  acetaminophen     Tablet .. 650 milliGRAM(s) Oral every 6 hours PRN Temp greater or equal to 38C (100.4F), Mild Pain (1 - 3)  aluminum hydroxide/magnesium hydroxide/simethicone Suspension 30 milliLiter(s) Oral every 4 hours PRN Dyspepsia  benzonatate 100 milliGRAM(s) Oral every 8 hours PRN Cough  guaiFENesin Oral Liquid (Sugar-Free) 200 milliGRAM(s) Oral every 6 hours PRN Cough  melatonin 3 milliGRAM(s) Oral at bedtime PRN Insomnia      RADIOLOGY & ADDITIONAL TESTS:

## 2023-03-09 ENCOUNTER — TRANSCRIPTION ENCOUNTER (OUTPATIENT)
Age: 58
End: 2023-03-09

## 2023-03-09 VITALS
SYSTOLIC BLOOD PRESSURE: 123 MMHG | OXYGEN SATURATION: 95 % | HEART RATE: 84 BPM | TEMPERATURE: 98 F | RESPIRATION RATE: 20 BRPM | DIASTOLIC BLOOD PRESSURE: 71 MMHG

## 2023-03-09 LAB
ALBUMIN SERPL ELPH-MCNC: 3.2 G/DL — LOW (ref 3.3–5.2)
ALP SERPL-CCNC: 96 U/L — SIGNIFICANT CHANGE UP (ref 40–120)
ALT FLD-CCNC: 171 U/L — HIGH
ANION GAP SERPL CALC-SCNC: 12 MMOL/L — SIGNIFICANT CHANGE UP (ref 5–17)
AST SERPL-CCNC: 78 U/L — HIGH
BILIRUB SERPL-MCNC: 0.4 MG/DL — SIGNIFICANT CHANGE UP (ref 0.4–2)
BUN SERPL-MCNC: 18.8 MG/DL — SIGNIFICANT CHANGE UP (ref 8–20)
CALCIUM SERPL-MCNC: 8.2 MG/DL — LOW (ref 8.4–10.5)
CHLORIDE SERPL-SCNC: 98 MMOL/L — SIGNIFICANT CHANGE UP (ref 96–108)
CO2 SERPL-SCNC: 27 MMOL/L — SIGNIFICANT CHANGE UP (ref 22–29)
CREAT SERPL-MCNC: 0.62 MG/DL — SIGNIFICANT CHANGE UP (ref 0.5–1.3)
CULTURE RESULTS: NO GROWTH — SIGNIFICANT CHANGE UP
EGFR: 111 ML/MIN/1.73M2 — SIGNIFICANT CHANGE UP
GLUCOSE SERPL-MCNC: 116 MG/DL — HIGH (ref 70–99)
HCT VFR BLD CALC: 39.1 % — SIGNIFICANT CHANGE UP (ref 39–50)
HGB BLD-MCNC: 13.5 G/DL — SIGNIFICANT CHANGE UP (ref 13–17)
INR BLD: 1.11 RATIO — SIGNIFICANT CHANGE UP (ref 0.88–1.16)
MCHC RBC-ENTMCNC: 29.2 PG — SIGNIFICANT CHANGE UP (ref 27–34)
MCHC RBC-ENTMCNC: 34.5 GM/DL — SIGNIFICANT CHANGE UP (ref 32–36)
MCV RBC AUTO: 84.4 FL — SIGNIFICANT CHANGE UP (ref 80–100)
PLATELET # BLD AUTO: 332 K/UL — SIGNIFICANT CHANGE UP (ref 150–400)
POTASSIUM SERPL-MCNC: 3.3 MMOL/L — LOW (ref 3.5–5.3)
POTASSIUM SERPL-SCNC: 3.3 MMOL/L — LOW (ref 3.5–5.3)
PROT SERPL-MCNC: 6.5 G/DL — LOW (ref 6.6–8.7)
PROTHROM AB SERPL-ACNC: 12.9 SEC — SIGNIFICANT CHANGE UP (ref 10.5–13.4)
RBC # BLD: 4.63 M/UL — SIGNIFICANT CHANGE UP (ref 4.2–5.8)
RBC # FLD: 13.3 % — SIGNIFICANT CHANGE UP (ref 10.3–14.5)
SODIUM SERPL-SCNC: 137 MMOL/L — SIGNIFICANT CHANGE UP (ref 135–145)
SPECIMEN SOURCE: SIGNIFICANT CHANGE UP
WBC # BLD: 8.88 K/UL — SIGNIFICANT CHANGE UP (ref 3.8–10.5)
WBC # FLD AUTO: 8.88 K/UL — SIGNIFICANT CHANGE UP (ref 3.8–10.5)

## 2023-03-09 PROCEDURE — 93005 ELECTROCARDIOGRAM TRACING: CPT

## 2023-03-09 PROCEDURE — 83735 ASSAY OF MAGNESIUM: CPT

## 2023-03-09 PROCEDURE — 80048 BASIC METABOLIC PNL TOTAL CA: CPT

## 2023-03-09 PROCEDURE — 85018 HEMOGLOBIN: CPT

## 2023-03-09 PROCEDURE — 84145 PROCALCITONIN (PCT): CPT

## 2023-03-09 PROCEDURE — 85027 COMPLETE CBC AUTOMATED: CPT

## 2023-03-09 PROCEDURE — 36415 COLL VENOUS BLD VENIPUNCTURE: CPT

## 2023-03-09 PROCEDURE — 80053 COMPREHEN METABOLIC PANEL: CPT

## 2023-03-09 PROCEDURE — 86803 HEPATITIS C AB TEST: CPT

## 2023-03-09 PROCEDURE — 94760 N-INVAS EAR/PLS OXIMETRY 1: CPT

## 2023-03-09 PROCEDURE — 71045 X-RAY EXAM CHEST 1 VIEW: CPT

## 2023-03-09 PROCEDURE — 71275 CT ANGIOGRAPHY CHEST: CPT

## 2023-03-09 PROCEDURE — 85379 FIBRIN DEGRADATION QUANT: CPT

## 2023-03-09 PROCEDURE — 99285 EMERGENCY DEPT VISIT HI MDM: CPT | Mod: 25

## 2023-03-09 PROCEDURE — 71250 CT THORAX DX C-: CPT | Mod: MA

## 2023-03-09 PROCEDURE — 82803 BLOOD GASES ANY COMBINATION: CPT

## 2023-03-09 PROCEDURE — 84132 ASSAY OF SERUM POTASSIUM: CPT

## 2023-03-09 PROCEDURE — 94640 AIRWAY INHALATION TREATMENT: CPT

## 2023-03-09 PROCEDURE — 0225U NFCT DS DNA&RNA 21 SARSCOV2: CPT

## 2023-03-09 PROCEDURE — 83605 ASSAY OF LACTIC ACID: CPT

## 2023-03-09 PROCEDURE — 82728 ASSAY OF FERRITIN: CPT

## 2023-03-09 PROCEDURE — 36600 WITHDRAWAL OF ARTERIAL BLOOD: CPT

## 2023-03-09 PROCEDURE — 84295 ASSAY OF SERUM SODIUM: CPT

## 2023-03-09 PROCEDURE — 81001 URINALYSIS AUTO W/SCOPE: CPT

## 2023-03-09 PROCEDURE — 87086 URINE CULTURE/COLONY COUNT: CPT

## 2023-03-09 PROCEDURE — 85730 THROMBOPLASTIN TIME PARTIAL: CPT

## 2023-03-09 PROCEDURE — 99239 HOSP IP/OBS DSCHRG MGMT >30: CPT

## 2023-03-09 PROCEDURE — 82947 ASSAY GLUCOSE BLOOD QUANT: CPT

## 2023-03-09 PROCEDURE — 87637 SARSCOV2&INF A&B&RSV AMP PRB: CPT

## 2023-03-09 PROCEDURE — 85014 HEMATOCRIT: CPT

## 2023-03-09 PROCEDURE — 82435 ASSAY OF BLOOD CHLORIDE: CPT

## 2023-03-09 PROCEDURE — 96365 THER/PROPH/DIAG IV INF INIT: CPT

## 2023-03-09 PROCEDURE — 87641 MR-STAPH DNA AMP PROBE: CPT

## 2023-03-09 PROCEDURE — 85610 PROTHROMBIN TIME: CPT

## 2023-03-09 PROCEDURE — 96375 TX/PRO/DX INJ NEW DRUG ADDON: CPT

## 2023-03-09 PROCEDURE — 82330 ASSAY OF CALCIUM: CPT

## 2023-03-09 PROCEDURE — 87040 BLOOD CULTURE FOR BACTERIA: CPT

## 2023-03-09 PROCEDURE — 85025 COMPLETE CBC W/AUTO DIFF WBC: CPT

## 2023-03-09 PROCEDURE — 87640 STAPH A DNA AMP PROBE: CPT

## 2023-03-09 PROCEDURE — 86140 C-REACTIVE PROTEIN: CPT

## 2023-03-09 PROCEDURE — 83615 LACTATE (LD) (LDH) ENZYME: CPT

## 2023-03-09 RX ORDER — APIXABAN 2.5 MG/1
1 TABLET, FILM COATED ORAL
Qty: 42 | Refills: 0
Start: 2023-03-09 | End: 2023-03-29

## 2023-03-09 RX ORDER — ALBUTEROL 90 UG/1
2 AEROSOL, METERED ORAL
Qty: 1 | Refills: 0
Start: 2023-03-09

## 2023-03-09 RX ORDER — FLUTICASONE PROPIONATE 50 MCG
1 SPRAY, SUSPENSION NASAL
Qty: 0 | Refills: 0 | DISCHARGE
Start: 2023-03-09

## 2023-03-09 RX ORDER — BUDESONIDE AND FORMOTEROL FUMARATE DIHYDRATE 160; 4.5 UG/1; UG/1
2 AEROSOL RESPIRATORY (INHALATION)
Qty: 1 | Refills: 0
Start: 2023-03-09

## 2023-03-09 RX ORDER — RIVAROXABAN 15 MG-20MG
1 KIT ORAL
Qty: 17 | Refills: 0
Start: 2023-03-09 | End: 2023-03-25

## 2023-03-09 RX ORDER — DEXAMETHASONE 0.5 MG/5ML
1 ELIXIR ORAL
Qty: 6 | Refills: 0
Start: 2023-03-09 | End: 2023-03-14

## 2023-03-09 RX ADMIN — Medication 1 SPRAY(S): at 05:33

## 2023-03-09 RX ADMIN — MUPIROCIN 1 APPLICATION(S): 20 OINTMENT TOPICAL at 06:45

## 2023-03-09 RX ADMIN — Medication 6 MILLIGRAM(S): at 05:32

## 2023-03-09 RX ADMIN — ENOXAPARIN SODIUM 100 MILLIGRAM(S): 100 INJECTION SUBCUTANEOUS at 05:34

## 2023-03-09 RX ADMIN — ALBUTEROL 2 PUFF(S): 90 AEROSOL, METERED ORAL at 09:30

## 2023-03-09 RX ADMIN — Medication 50 MILLIGRAM(S): at 05:32

## 2023-03-09 RX ADMIN — ALBUTEROL 2 PUFF(S): 90 AEROSOL, METERED ORAL at 01:39

## 2023-03-09 RX ADMIN — BUDESONIDE AND FORMOTEROL FUMARATE DIHYDRATE 2 PUFF(S): 160; 4.5 AEROSOL RESPIRATORY (INHALATION) at 09:30

## 2023-03-09 RX ADMIN — AMLODIPINE BESYLATE 5 MILLIGRAM(S): 2.5 TABLET ORAL at 05:32

## 2023-03-09 RX ADMIN — ALBUTEROL 2 PUFF(S): 90 AEROSOL, METERED ORAL at 05:33

## 2023-03-09 NOTE — DISCHARGE NOTE NURSING/CASE MANAGEMENT/SOCIAL WORK - NSDCPEFALRISK_GEN_ALL_CORE
For information on Fall & Injury Prevention, visit: [email or web address] OR  [email or web address] OR  https://www.cdc.gov/steadi/patient.html

## 2023-03-09 NOTE — DISCHARGE NOTE NURSING/CASE MANAGEMENT/SOCIAL WORK - PATIENT PORTAL LINK FT
You can access the FollowMyHealth Patient Portal offered by [facility] by registering at the following website: [email or web address]. By joining [employer]’s FollowMyHealth portal, you will also be able to view your health information using other applications (apps) compatible with our system.

## 2023-03-09 NOTE — DISCHARGE NOTE PROVIDER - NSDCMRMEDTOKEN_GEN_ALL_CORE_FT
amLODIPine 5 mg oral tablet: 1 tab(s) orally once a day  metoprolol succinate 50 mg oral tablet, extended release: 1 tab(s) orally once a day   albuterol 90 mcg/inh inhalation aerosol: 2 puff(s) inhaled every 6 hours, As Needed   amLODIPine 5 mg oral tablet: 1 tab(s) orally once a day  benzonatate 100 mg oral capsule: 1 cap(s) orally every 8 hours, As needed, Cough  budesonide-formoterol 160 mcg-4.5 mcg/inh inhalation aerosol: 2 puff(s) inhaled 2 times a day   dexamethasone 6 mg oral tablet: 1 tab(s) orally once a day   fluticasone 50 mcg/inh nasal spray: 1 spray(s) nasal 2 times a day  metoprolol succinate 50 mg oral tablet, extended release: 1 tab(s) orally once a day  Xarelto 10 mg oral tablet: 1 tab(s) orally once a day

## 2023-03-09 NOTE — DISCHARGE NOTE PROVIDER - HOSPITAL COURSE
57 year old male with a PMH of prostate CA (not on tx), HTN, came in complaining of SOB with associated productive cough and subjective fevers x 1 week after returning from [place] via plane.  Patient had telehealth visit with his PCP 5 days ago and was sent for an outpatient CXR and as per patient was told he had a "double PNA" and was prescribed Levaquin 750mg. Despite treatment, his symptoms persisted and he continues to have fevers.  Patient reports his fever yesterday was as high as 103.6.  He has been taking NSAIDs and tylenol for fevers and has been compliant with his antibiotics but symptoms have worsened. In the ED hen was found to be septic with tachycardia and tachypnea. CT chest with b/l multifocal PNA.  He was administered Rocephin and Azithromycin.  Patient was also found to be COVID positive.  Patient admitted for sepsis due to PNA. He was placed on isolation and started on Remdesivir and Decadron. CTA was negative for a PE. He had an 57 year old male with a PMH of prostate CA (not on tx), HTN, came in complaining of SOB with associated productive cough and subjective fevers x 1 week after returning from [place] via plane.  Patient had telehealth visit with his PCP 5 days ago and was sent for an outpatient CXR and as per patient was told he had a "double PNA" and was prescribed Levaquin 750mg. Despite treatment, his symptoms persisted and he continues to have fevers.  Patient reports his fever yesterday was as high as 103.6.  He has been taking NSAIDs and tylenol for fevers and has been compliant with his antibiotics but symptoms have worsened. In the ED hen was found to be septic with tachycardia and tachypnea (sepsis) and found to be COVID positive. CT chest with b/l multifocal infiltrate.  He was administered Rocephin and Azithromycin in the ED which. was not continued on admission. CTA was negative for a PE.. Additionally patient was positive in the nares for MRSA as well as adenovirus. Patient clinically improved with management for COVID (remdesivir and dexamethasone). Patient at this time off O2 and ambulating well. Medically ready for discharge.

## 2023-03-09 NOTE — DISCHARGE NOTE PROVIDER - NSDCCPCAREPLAN_GEN_ALL_CORE_FT
PRINCIPAL DISCHARGE DIAGNOSIS  Diagnosis: Pneumonia due to COVID-19 virus  Assessment and Plan of Treatment: continue with dexamethasone 6mg daily for 6 more days  continue with xarelto 10mg daily  for 17 more days  continue to use symbicort for duration of dexamethasone  continue to use albuterol for duration of dexamethasone  follow up with primary care physician on discharge.      SECONDARY DISCHARGE DIAGNOSES  Diagnosis: HTN (hypertension)  Assessment and Plan of Treatment: continue with amlodipine and metoprolol on discharge

## 2023-03-10 LAB
CULTURE RESULTS: SIGNIFICANT CHANGE UP
CULTURE RESULTS: SIGNIFICANT CHANGE UP
SPECIMEN SOURCE: SIGNIFICANT CHANGE UP
SPECIMEN SOURCE: SIGNIFICANT CHANGE UP

## 2024-06-27 ENCOUNTER — APPOINTMENT (OUTPATIENT)
Dept: PULMONOLOGY | Facility: CLINIC | Age: 59
End: 2024-06-27